# Patient Record
Sex: FEMALE | Race: OTHER | HISPANIC OR LATINO | URBAN - METROPOLITAN AREA
[De-identification: names, ages, dates, MRNs, and addresses within clinical notes are randomized per-mention and may not be internally consistent; named-entity substitution may affect disease eponyms.]

---

## 2019-08-01 ENCOUNTER — INPATIENT (INPATIENT)
Facility: HOSPITAL | Age: 59
LOS: 4 days | Discharge: HOME | End: 2019-08-06
Attending: INTERNAL MEDICINE | Admitting: INTERNAL MEDICINE
Payer: MEDICAID

## 2019-08-01 VITALS
OXYGEN SATURATION: 97 % | DIASTOLIC BLOOD PRESSURE: 71 MMHG | RESPIRATION RATE: 18 BRPM | SYSTOLIC BLOOD PRESSURE: 105 MMHG | HEART RATE: 106 BPM

## 2019-08-01 LAB
ALBUMIN SERPL ELPH-MCNC: 3.5 G/DL — SIGNIFICANT CHANGE UP (ref 3.3–5)
ALP SERPL-CCNC: 59 U/L — SIGNIFICANT CHANGE UP (ref 40–120)
ALT FLD-CCNC: 13 U/L — SIGNIFICANT CHANGE UP (ref 4–33)
ANION GAP SERPL CALC-SCNC: 13 MMO/L — SIGNIFICANT CHANGE UP (ref 7–14)
APTT BLD: 21.6 SEC — LOW (ref 27.5–36.3)
AST SERPL-CCNC: 25 U/L — SIGNIFICANT CHANGE UP (ref 4–32)
BASE EXCESS BLDV CALC-SCNC: -0.3 MMOL/L — SIGNIFICANT CHANGE UP
BASOPHILS # BLD AUTO: 0.03 K/UL — SIGNIFICANT CHANGE UP (ref 0–0.2)
BASOPHILS NFR BLD AUTO: 0.3 % — SIGNIFICANT CHANGE UP (ref 0–2)
BILIRUB SERPL-MCNC: < 0.2 MG/DL — LOW (ref 0.2–1.2)
BLD GP AB SCN SERPL QL: NEGATIVE — SIGNIFICANT CHANGE UP
BLOOD GAS VENOUS - CREATININE: 1.08 MG/DL — SIGNIFICANT CHANGE UP (ref 0.5–1.3)
BLOOD GAS VENOUS - FIO2: 21 — SIGNIFICANT CHANGE UP
BUN SERPL-MCNC: 42 MG/DL — HIGH (ref 7–23)
CALCIUM SERPL-MCNC: 9.1 MG/DL — SIGNIFICANT CHANGE UP (ref 8.4–10.5)
CHLORIDE BLDV-SCNC: 101 MMOL/L — SIGNIFICANT CHANGE UP (ref 96–108)
CHLORIDE SERPL-SCNC: 98 MMOL/L — SIGNIFICANT CHANGE UP (ref 98–107)
CO2 SERPL-SCNC: 22 MMOL/L — SIGNIFICANT CHANGE UP (ref 22–31)
CREAT SERPL-MCNC: 0.9 MG/DL — SIGNIFICANT CHANGE UP (ref 0.5–1.3)
EOSINOPHIL # BLD AUTO: 0.01 K/UL — SIGNIFICANT CHANGE UP (ref 0–0.5)
EOSINOPHIL NFR BLD AUTO: 0.1 % — SIGNIFICANT CHANGE UP (ref 0–6)
GAS PNL BLDV: 129 MMOL/L — LOW (ref 136–146)
GLUCOSE BLDV-MCNC: 143 MG/DL — HIGH (ref 70–99)
GLUCOSE SERPL-MCNC: 139 MG/DL — HIGH (ref 70–99)
HCO3 BLDV-SCNC: 24 MMOL/L — SIGNIFICANT CHANGE UP (ref 20–27)
HCT VFR BLD CALC: 17.9 % — CRITICAL LOW (ref 34.5–45)
HCT VFR BLDV CALC: 20.1 % — CRITICAL LOW (ref 34.5–45)
HGB BLD-MCNC: 5.9 G/DL — CRITICAL LOW (ref 11.5–15.5)
HGB BLDV-MCNC: 6.4 G/DL — CRITICAL LOW (ref 11.5–15.5)
IMM GRANULOCYTES NFR BLD AUTO: 0.7 % — SIGNIFICANT CHANGE UP (ref 0–1.5)
INR BLD: 0.98 — SIGNIFICANT CHANGE UP (ref 0.88–1.17)
LACTATE BLDV-MCNC: 2.5 MMOL/L — HIGH (ref 0.5–2)
LYMPHOCYTES # BLD AUTO: 2.71 K/UL — SIGNIFICANT CHANGE UP (ref 1–3.3)
LYMPHOCYTES # BLD AUTO: 31.4 % — SIGNIFICANT CHANGE UP (ref 13–44)
MCHC RBC-ENTMCNC: 32.6 PG — SIGNIFICANT CHANGE UP (ref 27–34)
MCHC RBC-ENTMCNC: 33 % — SIGNIFICANT CHANGE UP (ref 32–36)
MCV RBC AUTO: 98.9 FL — SIGNIFICANT CHANGE UP (ref 80–100)
MONOCYTES # BLD AUTO: 0.52 K/UL — SIGNIFICANT CHANGE UP (ref 0–0.9)
MONOCYTES NFR BLD AUTO: 6 % — SIGNIFICANT CHANGE UP (ref 2–14)
NEUTROPHILS # BLD AUTO: 5.31 K/UL — SIGNIFICANT CHANGE UP (ref 1.8–7.4)
NEUTROPHILS NFR BLD AUTO: 61.5 % — SIGNIFICANT CHANGE UP (ref 43–77)
NRBC # FLD: 0 K/UL — SIGNIFICANT CHANGE UP (ref 0–0)
OB PNL STL: POSITIVE — SIGNIFICANT CHANGE UP
PCO2 BLDV: 35 MMHG — LOW (ref 41–51)
PH BLDV: 7.44 PH — HIGH (ref 7.32–7.43)
PLATELET # BLD AUTO: 273 K/UL — SIGNIFICANT CHANGE UP (ref 150–400)
PMV BLD: 9.1 FL — SIGNIFICANT CHANGE UP (ref 7–13)
PO2 BLDV: 26 MMHG — LOW (ref 35–40)
POTASSIUM BLDV-SCNC: 3.4 MMOL/L — SIGNIFICANT CHANGE UP (ref 3.4–4.5)
POTASSIUM SERPL-MCNC: 4.3 MMOL/L — SIGNIFICANT CHANGE UP (ref 3.5–5.3)
POTASSIUM SERPL-SCNC: 4.3 MMOL/L — SIGNIFICANT CHANGE UP (ref 3.5–5.3)
PROT SERPL-MCNC: 5.9 G/DL — LOW (ref 6–8.3)
PROTHROM AB SERPL-ACNC: 11.2 SEC — SIGNIFICANT CHANGE UP (ref 9.8–13.1)
RBC # BLD: 1.81 M/UL — LOW (ref 3.8–5.2)
RBC # FLD: 12.8 % — SIGNIFICANT CHANGE UP (ref 10.3–14.5)
RH IG SCN BLD-IMP: POSITIVE — SIGNIFICANT CHANGE UP
SAO2 % BLDV: 43.4 % — LOW (ref 60–85)
SODIUM SERPL-SCNC: 133 MMOL/L — LOW (ref 135–145)
TROPONIN T, HIGH SENSITIVITY: 35 NG/L — SIGNIFICANT CHANGE UP (ref ?–14)
WBC # BLD: 8.64 K/UL — SIGNIFICANT CHANGE UP (ref 3.8–10.5)
WBC # FLD AUTO: 8.64 K/UL — SIGNIFICANT CHANGE UP (ref 3.8–10.5)

## 2019-08-01 RX ORDER — CEFTRIAXONE 500 MG/1
1000 INJECTION, POWDER, FOR SOLUTION INTRAMUSCULAR; INTRAVENOUS ONCE
Refills: 0 | Status: COMPLETED | OUTPATIENT
Start: 2019-08-01 | End: 2019-08-01

## 2019-08-01 RX ORDER — SODIUM CHLORIDE 9 MG/ML
1000 INJECTION INTRAMUSCULAR; INTRAVENOUS; SUBCUTANEOUS ONCE
Refills: 0 | Status: COMPLETED | OUTPATIENT
Start: 2019-08-01 | End: 2019-08-01

## 2019-08-01 RX ORDER — OCTREOTIDE ACETATE 200 UG/ML
50 INJECTION, SOLUTION INTRAVENOUS; SUBCUTANEOUS ONCE
Refills: 0 | Status: COMPLETED | OUTPATIENT
Start: 2019-08-01 | End: 2019-08-01

## 2019-08-01 RX ORDER — ACETAMINOPHEN 500 MG
975 TABLET ORAL ONCE
Refills: 0 | Status: COMPLETED | OUTPATIENT
Start: 2019-08-01 | End: 2019-08-01

## 2019-08-01 RX ORDER — METOCLOPRAMIDE HCL 10 MG
10 TABLET ORAL ONCE
Refills: 0 | Status: COMPLETED | OUTPATIENT
Start: 2019-08-01 | End: 2019-08-01

## 2019-08-01 RX ORDER — PANTOPRAZOLE SODIUM 20 MG/1
8 TABLET, DELAYED RELEASE ORAL
Qty: 80 | Refills: 0 | Status: DISCONTINUED | OUTPATIENT
Start: 2019-08-01 | End: 2019-08-04

## 2019-08-01 RX ORDER — PANTOPRAZOLE SODIUM 20 MG/1
80 TABLET, DELAYED RELEASE ORAL ONCE
Refills: 0 | Status: COMPLETED | OUTPATIENT
Start: 2019-08-01 | End: 2019-08-01

## 2019-08-01 RX ADMIN — PANTOPRAZOLE SODIUM 80 MILLIGRAM(S): 20 TABLET, DELAYED RELEASE ORAL at 22:04

## 2019-08-01 RX ADMIN — SODIUM CHLORIDE 1000 MILLILITER(S): 9 INJECTION INTRAMUSCULAR; INTRAVENOUS; SUBCUTANEOUS at 21:25

## 2019-08-01 RX ADMIN — CEFTRIAXONE 100 MILLIGRAM(S): 500 INJECTION, POWDER, FOR SOLUTION INTRAMUSCULAR; INTRAVENOUS at 22:19

## 2019-08-01 RX ADMIN — Medication 975 MILLIGRAM(S): at 22:30

## 2019-08-01 RX ADMIN — Medication 10 MILLIGRAM(S): at 21:25

## 2019-08-01 RX ADMIN — PANTOPRAZOLE SODIUM 10 MG/HR: 20 TABLET, DELAYED RELEASE ORAL at 22:26

## 2019-08-01 RX ADMIN — Medication 975 MILLIGRAM(S): at 21:26

## 2019-08-01 NOTE — ED ADULT NURSE NOTE - NSIMPLEMENTINTERV_GEN_ALL_ED
Implemented All Universal Safety Interventions:  [place] to call system. Call bell, personal items and telephone within reach. Instruct patient to call for assistance. Room bathroom lighting operational. Non-slip footwear when patient is off stretcher. Physically safe environment: no spills, clutter or unnecessary equipment. Stretcher in lowest position, wheels locked, appropriate side rails in place.

## 2019-08-01 NOTE — ED PROVIDER NOTE - CRANIAL NERVE AND PUPILLARY EXAM
cranial nerves 2-12 intact/cough reflex intact/corneal reflex intact/central and peripheral vision intact/central vision intact/peripheral vision intact/PERIPHERAL VISION NOT INTACT/extra-ocular movements intact

## 2019-08-01 NOTE — ED PROVIDER NOTE - ATTENDING CONTRIBUTION TO CARE
Celeste: presented to me by TONG Lang at 2140. Pt initially came to ED for headache and dizziness. While waiting for evaluation pt developed nausea and vomiting and then emesis of coffee ground 1/2 bag. Pt also complaining of some lower chest and abd pain. On exam, initial vitals stable but then noted to be hypotensive and tachy. noted to be pale, lungs clear, abd soft with +tn in epigastric area. Daughter then stated that pt is a daily alcohol drinker. Concerned for GI bleed. variceal/esophageal/gastric ulcer. PPI, 2 L IV fluids with pressure bag, 2 large bore IVs, type and cross. Pt requires more care so transferred from intake to TR B and endorsed to Dr. Carnes. Daughter aware of pt's critical nature.

## 2019-08-01 NOTE — ED PROVIDER NOTE - PHYSICAL EXAMINATION
*Gen: AAO*3, restless/moving around in med  *HEENT: NC/AT, conjunctival pallor, dry mucous membranes, airway patent, trachea midline  *CV: tachycardia, S1/S2 present, no murmurs/rubs  *Resp: no respiratory distress, LCTAB, no wheezing/rales  *Abd: non-distended, soft N/Tx4, no guarding or rigidity  *Neuro: no focal neuro deficits, moving all limbs appropriately  *Extremities: no gross deformity  *Skin: no rashes, no wounds   ~ Avelino Tolentino M.D.

## 2019-08-01 NOTE — ED ADULT NURSE NOTE - OBJECTIVE STATEMENT
received pt sitting in stretcher reporting headache and pain in ears with nausea for a few days.  pt reports she is also now having chest pain.  IV placed, blood drawn and pt medicated.  As pt was being medicated had episode of moderate amount of dark red blood emesis.  pt hypotensive and tachycardic.  md immediately to bedside. pt drinks alcohol daily, approx 6 beers, no hx of similar symptoms in the past, last drink yesterday. 2nd iv access obtained, #18 to left AC, fluids infusing.  charge rn made aware to prepare room in main ed.  pt brought to trb and endorsed to RN.

## 2019-08-01 NOTE — ED PROVIDER NOTE - CLINICAL SUMMARY MEDICAL DECISION MAKING FREE TEXT BOX
Headache dizziness-labs, fluids, reglan, tylenol, ekg trop, chest xray Headache dizziness-labs, fluids, reglan, tylenol, ekg trop, chest xray    Randa VENEGAS: 58 year-old female with history of HTN, and chronic alcohol use presents to the Emergency Department for lightheadedness/weakness x 2 days; concerns for upper GI bleed.  Plan for labs, CTA, Protonix and TBA.

## 2019-08-01 NOTE — ED PROVIDER NOTE - OBJECTIVE STATEMENT
58 y.o female with a PMHx of HTN, current smoker and chronic drinker presents to the ED complaining of having dizziness and headache on and off over the past month along with chest pain over the past 2 days. Patient states she started to have a generalized headache on and off and describes her dizziness as a spinning sensation. Patient also admits to having left sided chest pain over the past few days. Pt also admits to having associated shortness of breath. patient denies having any fever, chills, abdominal pain. 58 y.o female with a PMHx of HTN, current smoker and chronic drinker presents to the ED complaining of having dizziness and headache on and off over the past month along with chest pain over the past 2 days. Patient states she started to have a generalized headache on and off and describes her dizziness as a spinning sensation. Patient also admits to having left sided chest pain over the past few days. Pt also admits to having associated shortness of breath. patient denies having any fever, chills, abdominal pain.    Randa VENEGAS: 58 year-old female with history of HTN, and chronic alcohol use presents to the Emergency Department for lightheadedness/weakness x 2 days; progressively worsening.  Seen in Intake; found to have an episode of hematemesis and hypotension to th 80s and transferred to the critical area for further evaluation.  Patient reports feeling better after emesis episode.  + epigastric abdominal pain; now resolved.  No fevers, chills, chest pain, shortness of breath.  No prior endoscopy/colonscopy in the past.

## 2019-08-01 NOTE — ED PROVIDER NOTE - PROGRESS NOTE DETAILS
PA Ali: Patient started to become hypotensive and tachycardic. Patient had hematemesis after patient had IV place. Patient started to become pale. Will contact charge nurse and transfer patient to main ER. Randa VENEGAS: Noted Hb of 5.9; ordered two units of HB.  Consent done.  Family aware.  Pend. CTA. Randa VENEGAS: Spoke with GI; req. Octreotide and Ceftriaxone. Mirza PGY3: received sign out from [employer] dr. canchola, pt w c/f upper gi bleed, had episode of syncope while going to bathroom put out copious melanotic / BRBPR appprox 1 pint, bp 90's systolic, per radiology resident c/f brisk arterial gastric antrum bleed, discussed with micu and gi, would recommend evaluation from IR for possible embolization of arterial gastric antrum bleed, paged IR x3 since 0245, awaiting call back. WILBER: Talked to IR attending and he is on board, rec's GI to EGD and if unsuccessful then they will try embolization. Pt receiving PRBC 4th and 5th unit, will repeat CBC to get Hb above 7 for GI

## 2019-08-01 NOTE — ED ADULT TRIAGE NOTE - CHIEF COMPLAINT QUOTE
Pt c/o headache, B/L ear pain, occipital headache, and dizziness.  Pt started taking BP meds 2 months ago and thinks her BP is sometimes low and sometimes high

## 2019-08-02 DIAGNOSIS — Z29.9 ENCOUNTER FOR PROPHYLACTIC MEASURES, UNSPECIFIED: ICD-10-CM

## 2019-08-02 DIAGNOSIS — F41.9 ANXIETY DISORDER, UNSPECIFIED: ICD-10-CM

## 2019-08-02 DIAGNOSIS — K92.2 GASTROINTESTINAL HEMORRHAGE, UNSPECIFIED: ICD-10-CM

## 2019-08-02 DIAGNOSIS — F10.10 ALCOHOL ABUSE, UNCOMPLICATED: ICD-10-CM

## 2019-08-02 DIAGNOSIS — F17.200 NICOTINE DEPENDENCE, UNSPECIFIED, UNCOMPLICATED: ICD-10-CM

## 2019-08-02 DIAGNOSIS — R65.10 SYSTEMIC INFLAMMATORY RESPONSE SYNDROME (SIRS) OF NON-INFECTIOUS ORIGIN WITHOUT ACUTE ORGAN DYSFUNCTION: ICD-10-CM

## 2019-08-02 DIAGNOSIS — D50.0 IRON DEFICIENCY ANEMIA SECONDARY TO BLOOD LOSS (CHRONIC): ICD-10-CM

## 2019-08-02 DIAGNOSIS — I10 ESSENTIAL (PRIMARY) HYPERTENSION: ICD-10-CM

## 2019-08-02 DIAGNOSIS — Z98.890 OTHER SPECIFIED POSTPROCEDURAL STATES: Chronic | ICD-10-CM

## 2019-08-02 LAB
ALBUMIN SERPL ELPH-MCNC: 2.7 G/DL — LOW (ref 3.3–5)
ALP SERPL-CCNC: 46 U/L — SIGNIFICANT CHANGE UP (ref 40–120)
ALT FLD-CCNC: 12 U/L — SIGNIFICANT CHANGE UP (ref 4–33)
ANION GAP SERPL CALC-SCNC: 10 MMO/L — SIGNIFICANT CHANGE UP (ref 7–14)
ANION GAP SERPL CALC-SCNC: 6 MMO/L — LOW (ref 7–14)
AST SERPL-CCNC: 23 U/L — SIGNIFICANT CHANGE UP (ref 4–32)
BASE EXCESS BLDV CALC-SCNC: -3.4 MMOL/L — SIGNIFICANT CHANGE UP
BASOPHILS # BLD AUTO: 0.02 K/UL — SIGNIFICANT CHANGE UP (ref 0–0.2)
BASOPHILS # BLD AUTO: 0.02 K/UL — SIGNIFICANT CHANGE UP (ref 0–0.2)
BASOPHILS # BLD AUTO: 0.04 K/UL — SIGNIFICANT CHANGE UP (ref 0–0.2)
BASOPHILS NFR BLD AUTO: 0.2 % — SIGNIFICANT CHANGE UP (ref 0–2)
BASOPHILS NFR BLD AUTO: 0.3 % — SIGNIFICANT CHANGE UP (ref 0–2)
BASOPHILS NFR BLD AUTO: 0.3 % — SIGNIFICANT CHANGE UP (ref 0–2)
BASOPHILS NFR SPEC: 0 % — SIGNIFICANT CHANGE UP (ref 0–2)
BILIRUB SERPL-MCNC: 0.9 MG/DL — SIGNIFICANT CHANGE UP (ref 0.2–1.2)
BLASTS # FLD: 0 % — SIGNIFICANT CHANGE UP (ref 0–0)
BLOOD GAS VENOUS - CREATININE: 0.72 MG/DL — SIGNIFICANT CHANGE UP (ref 0.5–1.3)
BLOOD GAS VENOUS - FIO2: 21 — SIGNIFICANT CHANGE UP
BUN SERPL-MCNC: 17 MG/DL — SIGNIFICANT CHANGE UP (ref 7–23)
BUN SERPL-MCNC: 24 MG/DL — HIGH (ref 7–23)
CALCIUM SERPL-MCNC: 7.4 MG/DL — LOW (ref 8.4–10.5)
CALCIUM SERPL-MCNC: 8 MG/DL — LOW (ref 8.4–10.5)
CHLORIDE BLDV-SCNC: 112 MMOL/L — HIGH (ref 96–108)
CHLORIDE SERPL-SCNC: 109 MMOL/L — HIGH (ref 98–107)
CHLORIDE SERPL-SCNC: 113 MMOL/L — HIGH (ref 98–107)
CO2 SERPL-SCNC: 19 MMOL/L — LOW (ref 22–31)
CO2 SERPL-SCNC: 21 MMOL/L — LOW (ref 22–31)
CREAT SERPL-MCNC: 0.7 MG/DL — SIGNIFICANT CHANGE UP (ref 0.5–1.3)
CREAT SERPL-MCNC: 0.71 MG/DL — SIGNIFICANT CHANGE UP (ref 0.5–1.3)
EOSINOPHIL # BLD AUTO: 0 K/UL — SIGNIFICANT CHANGE UP (ref 0–0.5)
EOSINOPHIL # BLD AUTO: 0.01 K/UL — SIGNIFICANT CHANGE UP (ref 0–0.5)
EOSINOPHIL # BLD AUTO: 0.04 K/UL — SIGNIFICANT CHANGE UP (ref 0–0.5)
EOSINOPHIL NFR BLD AUTO: 0 % — SIGNIFICANT CHANGE UP (ref 0–6)
EOSINOPHIL NFR BLD AUTO: 0.1 % — SIGNIFICANT CHANGE UP (ref 0–6)
EOSINOPHIL NFR BLD AUTO: 0.3 % — SIGNIFICANT CHANGE UP (ref 0–6)
EOSINOPHIL NFR FLD: 0 % — SIGNIFICANT CHANGE UP (ref 0–6)
FERRITIN SERPL-MCNC: 45.07 NG/ML — SIGNIFICANT CHANGE UP (ref 15–150)
FOLATE SERPL-MCNC: 10 NG/ML — SIGNIFICANT CHANGE UP (ref 4.7–20)
GAS PNL BLDV: 134 MMOL/L — LOW (ref 136–146)
GLUCOSE BLDV-MCNC: 157 MG/DL — HIGH (ref 70–99)
GLUCOSE SERPL-MCNC: 113 MG/DL — HIGH (ref 70–99)
GLUCOSE SERPL-MCNC: 159 MG/DL — HIGH (ref 70–99)
HCO3 BLDV-SCNC: 21 MMOL/L — SIGNIFICANT CHANGE UP (ref 20–27)
HCT VFR BLD CALC: 18.4 % — CRITICAL LOW (ref 34.5–45)
HCT VFR BLD CALC: 26.1 % — LOW (ref 34.5–45)
HCT VFR BLD CALC: 26.6 % — LOW (ref 34.5–45)
HCT VFR BLD CALC: 29.9 % — LOW (ref 34.5–45)
HCT VFR BLD CALC: 29.9 % — LOW (ref 34.5–45)
HCT VFR BLDV CALC: 30 % — LOW (ref 34.5–45)
HGB BLD-MCNC: 10.2 G/DL — LOW (ref 11.5–15.5)
HGB BLD-MCNC: 10.3 G/DL — LOW (ref 11.5–15.5)
HGB BLD-MCNC: 6.1 G/DL — CRITICAL LOW (ref 11.5–15.5)
HGB BLD-MCNC: 9 G/DL — LOW (ref 11.5–15.5)
HGB BLD-MCNC: 9.2 G/DL — LOW (ref 11.5–15.5)
HGB BLDV-MCNC: 9.7 G/DL — LOW (ref 11.5–15.5)
HYPOCHROMIA BLD QL: SIGNIFICANT CHANGE UP
IMM GRANULOCYTES NFR BLD AUTO: 0.9 % — SIGNIFICANT CHANGE UP (ref 0–1.5)
IMM GRANULOCYTES NFR BLD AUTO: 0.9 % — SIGNIFICANT CHANGE UP (ref 0–1.5)
IMM GRANULOCYTES NFR BLD AUTO: 1.2 % — SIGNIFICANT CHANGE UP (ref 0–1.5)
IRON SATN MFR SERPL: 187 UG/DL — HIGH (ref 30–160)
IRON SATN MFR SERPL: 187 UG/DL — SIGNIFICANT CHANGE UP (ref 140–530)
LACTATE BLDV-MCNC: 1.3 MMOL/L — SIGNIFICANT CHANGE UP (ref 0.5–2)
LYMPHOCYTES # BLD AUTO: 1.1 K/UL — SIGNIFICANT CHANGE UP (ref 1–3.3)
LYMPHOCYTES # BLD AUTO: 1.72 K/UL — SIGNIFICANT CHANGE UP (ref 1–3.3)
LYMPHOCYTES # BLD AUTO: 12.4 % — LOW (ref 13–44)
LYMPHOCYTES # BLD AUTO: 17 % — SIGNIFICANT CHANGE UP (ref 13–44)
LYMPHOCYTES # BLD AUTO: 2.38 K/UL — SIGNIFICANT CHANGE UP (ref 1–3.3)
LYMPHOCYTES # BLD AUTO: 22.4 % — SIGNIFICANT CHANGE UP (ref 13–44)
LYMPHOCYTES NFR SPEC AUTO: 13.2 % — SIGNIFICANT CHANGE UP (ref 13–44)
MAGNESIUM SERPL-MCNC: 1.9 MG/DL — SIGNIFICANT CHANGE UP (ref 1.6–2.6)
MAGNESIUM SERPL-MCNC: 2 MG/DL — SIGNIFICANT CHANGE UP (ref 1.6–2.6)
MCHC RBC-ENTMCNC: 29.3 PG — SIGNIFICANT CHANGE UP (ref 27–34)
MCHC RBC-ENTMCNC: 29.7 PG — SIGNIFICANT CHANGE UP (ref 27–34)
MCHC RBC-ENTMCNC: 29.7 PG — SIGNIFICANT CHANGE UP (ref 27–34)
MCHC RBC-ENTMCNC: 30.4 PG — SIGNIFICANT CHANGE UP (ref 27–34)
MCHC RBC-ENTMCNC: 31.3 PG — SIGNIFICANT CHANGE UP (ref 27–34)
MCHC RBC-ENTMCNC: 33.2 % — SIGNIFICANT CHANGE UP (ref 32–36)
MCHC RBC-ENTMCNC: 34.1 % — SIGNIFICANT CHANGE UP (ref 32–36)
MCHC RBC-ENTMCNC: 34.4 % — SIGNIFICANT CHANGE UP (ref 32–36)
MCHC RBC-ENTMCNC: 34.5 % — SIGNIFICANT CHANGE UP (ref 32–36)
MCHC RBC-ENTMCNC: 34.6 % — SIGNIFICANT CHANGE UP (ref 32–36)
MCV RBC AUTO: 85.2 FL — SIGNIFICANT CHANGE UP (ref 80–100)
MCV RBC AUTO: 85.8 FL — SIGNIFICANT CHANGE UP (ref 80–100)
MCV RBC AUTO: 86.9 FL — SIGNIFICANT CHANGE UP (ref 80–100)
MCV RBC AUTO: 88.2 FL — SIGNIFICANT CHANGE UP (ref 80–100)
MCV RBC AUTO: 94.4 FL — SIGNIFICANT CHANGE UP (ref 80–100)
METAMYELOCYTES # FLD: 0.9 % — SIGNIFICANT CHANGE UP (ref 0–1)
MONOCYTES # BLD AUTO: 0.56 K/UL — SIGNIFICANT CHANGE UP (ref 0–0.9)
MONOCYTES # BLD AUTO: 0.58 K/UL — SIGNIFICANT CHANGE UP (ref 0–0.9)
MONOCYTES # BLD AUTO: 0.72 K/UL — SIGNIFICANT CHANGE UP (ref 0–0.9)
MONOCYTES NFR BLD AUTO: 5.1 % — SIGNIFICANT CHANGE UP (ref 2–14)
MONOCYTES NFR BLD AUTO: 6.3 % — SIGNIFICANT CHANGE UP (ref 2–14)
MONOCYTES NFR BLD AUTO: 7.6 % — SIGNIFICANT CHANGE UP (ref 2–14)
MONOCYTES NFR BLD: 0 % — LOW (ref 2–9)
MYELOCYTES NFR BLD: 0 % — SIGNIFICANT CHANGE UP (ref 0–0)
NEUTROPHIL AB SER-ACNC: 79.8 % — HIGH (ref 43–77)
NEUTROPHILS # BLD AUTO: 10.69 K/UL — HIGH (ref 1.8–7.4)
NEUTROPHILS # BLD AUTO: 5.27 K/UL — SIGNIFICANT CHANGE UP (ref 1.8–7.4)
NEUTROPHILS # BLD AUTO: 7.13 K/UL — SIGNIFICANT CHANGE UP (ref 1.8–7.4)
NEUTROPHILS NFR BLD AUTO: 68.7 % — SIGNIFICANT CHANGE UP (ref 43–77)
NEUTROPHILS NFR BLD AUTO: 76.1 % — SIGNIFICANT CHANGE UP (ref 43–77)
NEUTROPHILS NFR BLD AUTO: 80.2 % — HIGH (ref 43–77)
NEUTS BAND # BLD: 6.1 % — HIGH (ref 0–6)
NRBC # FLD: 0 K/UL — SIGNIFICANT CHANGE UP (ref 0–0)
NRBC # FLD: 0.02 K/UL — SIGNIFICANT CHANGE UP (ref 0–0)
NRBC # FLD: 0.03 K/UL — SIGNIFICANT CHANGE UP (ref 0–0)
OTHER - HEMATOLOGY %: 0 — SIGNIFICANT CHANGE UP
PCO2 BLDV: 39 MMHG — LOW (ref 41–51)
PH BLDV: 7.36 PH — SIGNIFICANT CHANGE UP (ref 7.32–7.43)
PHOSPHATE SERPL-MCNC: 2.9 MG/DL — SIGNIFICANT CHANGE UP (ref 2.5–4.5)
PHOSPHATE SERPL-MCNC: 3 MG/DL — SIGNIFICANT CHANGE UP (ref 2.5–4.5)
PLATELET # BLD AUTO: 165 K/UL — SIGNIFICANT CHANGE UP (ref 150–400)
PLATELET # BLD AUTO: 169 K/UL — SIGNIFICANT CHANGE UP (ref 150–400)
PLATELET # BLD AUTO: 193 K/UL — SIGNIFICANT CHANGE UP (ref 150–400)
PLATELET # BLD AUTO: 195 K/UL — SIGNIFICANT CHANGE UP (ref 150–400)
PLATELET # BLD AUTO: 203 K/UL — SIGNIFICANT CHANGE UP (ref 150–400)
PLATELET COUNT - ESTIMATE: NORMAL — SIGNIFICANT CHANGE UP
PMV BLD: 9.1 FL — SIGNIFICANT CHANGE UP (ref 7–13)
PMV BLD: 9.4 FL — SIGNIFICANT CHANGE UP (ref 7–13)
PMV BLD: 9.5 FL — SIGNIFICANT CHANGE UP (ref 7–13)
PMV BLD: 9.6 FL — SIGNIFICANT CHANGE UP (ref 7–13)
PMV BLD: 9.6 FL — SIGNIFICANT CHANGE UP (ref 7–13)
PO2 BLDV: 36 MMHG — SIGNIFICANT CHANGE UP (ref 35–40)
POTASSIUM BLDV-SCNC: 4.2 MMOL/L — SIGNIFICANT CHANGE UP (ref 3.4–4.5)
POTASSIUM SERPL-MCNC: 3.7 MMOL/L — SIGNIFICANT CHANGE UP (ref 3.5–5.3)
POTASSIUM SERPL-MCNC: 4.4 MMOL/L — SIGNIFICANT CHANGE UP (ref 3.5–5.3)
POTASSIUM SERPL-SCNC: 3.7 MMOL/L — SIGNIFICANT CHANGE UP (ref 3.5–5.3)
POTASSIUM SERPL-SCNC: 4.4 MMOL/L — SIGNIFICANT CHANGE UP (ref 3.5–5.3)
PROMYELOCYTES # FLD: 0 % — SIGNIFICANT CHANGE UP (ref 0–0)
PROT SERPL-MCNC: 4.5 G/DL — LOW (ref 6–8.3)
RBC # BLD: 1.95 M/UL — LOW (ref 3.8–5.2)
RBC # BLD: 2.96 M/UL — LOW (ref 3.8–5.2)
RBC # BLD: 3.1 M/UL — LOW (ref 3.8–5.2)
RBC # BLD: 3.44 M/UL — LOW (ref 3.8–5.2)
RBC # BLD: 3.51 M/UL — LOW (ref 3.8–5.2)
RBC # FLD: 15.3 % — HIGH (ref 10.3–14.5)
RBC # FLD: 15.9 % — HIGH (ref 10.3–14.5)
RBC # FLD: 16.7 % — HIGH (ref 10.3–14.5)
RBC # FLD: 17.7 % — HIGH (ref 10.3–14.5)
RBC # FLD: 18.1 % — HIGH (ref 10.3–14.5)
SAO2 % BLDV: 69 % — SIGNIFICANT CHANGE UP (ref 60–85)
SODIUM SERPL-SCNC: 138 MMOL/L — SIGNIFICANT CHANGE UP (ref 135–145)
SODIUM SERPL-SCNC: 140 MMOL/L — SIGNIFICANT CHANGE UP (ref 135–145)
UIBC SERPL-MCNC: < 10 UG/DL — LOW (ref 110–370)
VARIANT LYMPHS # BLD: 0 % — SIGNIFICANT CHANGE UP
VIT B12 SERPL-MCNC: 255 PG/ML — SIGNIFICANT CHANGE UP (ref 200–900)
WBC # BLD: 14.04 K/UL — HIGH (ref 3.8–10.5)
WBC # BLD: 7.67 K/UL — SIGNIFICANT CHANGE UP (ref 3.8–10.5)
WBC # BLD: 8.21 K/UL — SIGNIFICANT CHANGE UP (ref 3.8–10.5)
WBC # BLD: 8.89 K/UL — SIGNIFICANT CHANGE UP (ref 3.8–10.5)
WBC # BLD: 9.66 K/UL — SIGNIFICANT CHANGE UP (ref 3.8–10.5)
WBC # FLD AUTO: 14.04 K/UL — HIGH (ref 3.8–10.5)
WBC # FLD AUTO: 7.67 K/UL — SIGNIFICANT CHANGE UP (ref 3.8–10.5)
WBC # FLD AUTO: 8.21 K/UL — SIGNIFICANT CHANGE UP (ref 3.8–10.5)
WBC # FLD AUTO: 8.89 K/UL — SIGNIFICANT CHANGE UP (ref 3.8–10.5)
WBC # FLD AUTO: 9.66 K/UL — SIGNIFICANT CHANGE UP (ref 3.8–10.5)

## 2019-08-02 PROCEDURE — 43255 EGD CONTROL BLEEDING ANY: CPT | Mod: GC

## 2019-08-02 PROCEDURE — 99222 1ST HOSP IP/OBS MODERATE 55: CPT | Mod: 25,GC

## 2019-08-02 PROCEDURE — 74174 CTA ABD&PLVS W/CONTRAST: CPT | Mod: 26

## 2019-08-02 PROCEDURE — 99223 1ST HOSP IP/OBS HIGH 75: CPT | Mod: GC

## 2019-08-02 PROCEDURE — 31500 INSERT EMERGENCY AIRWAY: CPT

## 2019-08-02 PROCEDURE — 99291 CRITICAL CARE FIRST HOUR: CPT | Mod: 25

## 2019-08-02 PROCEDURE — 71045 X-RAY EXAM CHEST 1 VIEW: CPT | Mod: 26,76

## 2019-08-02 RX ORDER — PROPOFOL 10 MG/ML
40 INJECTION, EMULSION INTRAVENOUS ONCE
Refills: 0 | Status: COMPLETED | OUTPATIENT
Start: 2019-08-02 | End: 2019-08-02

## 2019-08-02 RX ORDER — FENTANYL CITRATE 50 UG/ML
100 INJECTION INTRAVENOUS ONCE
Refills: 0 | Status: DISCONTINUED | OUTPATIENT
Start: 2019-08-02 | End: 2019-08-02

## 2019-08-02 RX ORDER — METOCLOPRAMIDE HCL 10 MG
10 TABLET ORAL ONCE
Refills: 0 | Status: COMPLETED | OUTPATIENT
Start: 2019-08-02 | End: 2019-08-02

## 2019-08-02 RX ORDER — CHLORHEXIDINE GLUCONATE 213 G/1000ML
15 SOLUTION TOPICAL EVERY 12 HOURS
Refills: 0 | Status: DISCONTINUED | OUTPATIENT
Start: 2019-08-02 | End: 2019-08-03

## 2019-08-02 RX ORDER — MIDAZOLAM HYDROCHLORIDE 1 MG/ML
8 INJECTION, SOLUTION INTRAMUSCULAR; INTRAVENOUS ONCE
Refills: 0 | Status: DISCONTINUED | OUTPATIENT
Start: 2019-08-02 | End: 2019-08-02

## 2019-08-02 RX ORDER — THIAMINE MONONITRATE (VIT B1) 100 MG
100 TABLET ORAL DAILY
Refills: 0 | Status: DISCONTINUED | OUTPATIENT
Start: 2019-08-02 | End: 2019-08-02

## 2019-08-02 RX ORDER — THIAMINE MONONITRATE (VIT B1) 100 MG
100 TABLET ORAL ONCE
Refills: 0 | Status: DISCONTINUED | OUTPATIENT
Start: 2019-08-02 | End: 2019-08-02

## 2019-08-02 RX ORDER — SUCCINYLCHOLINE CHLORIDE 100 MG/5ML
75 SYRINGE (ML) INTRAVENOUS ONCE
Refills: 0 | Status: COMPLETED | OUTPATIENT
Start: 2019-08-02 | End: 2019-08-02

## 2019-08-02 RX ORDER — ONDANSETRON 8 MG/1
4 TABLET, FILM COATED ORAL ONCE
Refills: 0 | Status: COMPLETED | OUTPATIENT
Start: 2019-08-02 | End: 2019-08-02

## 2019-08-02 RX ORDER — FENTANYL CITRATE 50 UG/ML
1 INJECTION INTRAVENOUS
Qty: 2500 | Refills: 0 | Status: DISCONTINUED | OUTPATIENT
Start: 2019-08-02 | End: 2019-08-03

## 2019-08-02 RX ORDER — THIAMINE MONONITRATE (VIT B1) 100 MG
100 TABLET ORAL DAILY
Refills: 0 | Status: COMPLETED | OUTPATIENT
Start: 2019-08-02 | End: 2019-08-04

## 2019-08-02 RX ORDER — PROPOFOL 10 MG/ML
50 INJECTION, EMULSION INTRAVENOUS
Qty: 1000 | Refills: 0 | Status: DISCONTINUED | OUTPATIENT
Start: 2019-08-02 | End: 2019-08-03

## 2019-08-02 RX ORDER — CHLORHEXIDINE GLUCONATE 213 G/1000ML
1 SOLUTION TOPICAL
Refills: 0 | Status: DISCONTINUED | OUTPATIENT
Start: 2019-08-02 | End: 2019-08-03

## 2019-08-02 RX ORDER — NOREPINEPHRINE BITARTRATE/D5W 8 MG/250ML
0.05 PLASTIC BAG, INJECTION (ML) INTRAVENOUS
Qty: 8 | Refills: 0 | Status: DISCONTINUED | OUTPATIENT
Start: 2019-08-02 | End: 2019-08-03

## 2019-08-02 RX ORDER — MIDAZOLAM HYDROCHLORIDE 1 MG/ML
2 INJECTION, SOLUTION INTRAMUSCULAR; INTRAVENOUS ONCE
Refills: 0 | Status: DISCONTINUED | OUTPATIENT
Start: 2019-08-02 | End: 2019-08-02

## 2019-08-02 RX ORDER — MIDAZOLAM HYDROCHLORIDE 1 MG/ML
6 INJECTION, SOLUTION INTRAMUSCULAR; INTRAVENOUS ONCE
Refills: 0 | Status: DISCONTINUED | OUTPATIENT
Start: 2019-08-02 | End: 2019-08-02

## 2019-08-02 RX ORDER — MIDAZOLAM HYDROCHLORIDE 1 MG/ML
0.04 INJECTION, SOLUTION INTRAMUSCULAR; INTRAVENOUS
Qty: 100 | Refills: 0 | Status: DISCONTINUED | OUTPATIENT
Start: 2019-08-02 | End: 2019-08-03

## 2019-08-02 RX ORDER — FOLIC ACID 0.8 MG
1 TABLET ORAL DAILY
Refills: 0 | Status: DISCONTINUED | OUTPATIENT
Start: 2019-08-02 | End: 2019-08-06

## 2019-08-02 RX ORDER — NICOTINE POLACRILEX 2 MG
1 GUM BUCCAL DAILY
Refills: 0 | Status: DISCONTINUED | OUTPATIENT
Start: 2019-08-02 | End: 2019-08-06

## 2019-08-02 RX ORDER — SODIUM CHLORIDE 9 MG/ML
1000 INJECTION INTRAMUSCULAR; INTRAVENOUS; SUBCUTANEOUS ONCE
Refills: 0 | Status: COMPLETED | OUTPATIENT
Start: 2019-08-02 | End: 2019-08-02

## 2019-08-02 RX ADMIN — CHLORHEXIDINE GLUCONATE 1 APPLICATION(S): 213 SOLUTION TOPICAL at 12:07

## 2019-08-02 RX ADMIN — FENTANYL CITRATE 100 MICROGRAM(S): 50 INJECTION INTRAVENOUS at 14:00

## 2019-08-02 RX ADMIN — Medication 100 MILLIGRAM(S): at 12:06

## 2019-08-02 RX ADMIN — FENTANYL CITRATE 7.93 MICROGRAM(S)/KG/HR: 50 INJECTION INTRAVENOUS at 20:05

## 2019-08-02 RX ADMIN — Medication 1 PATCH: at 20:07

## 2019-08-02 RX ADMIN — SODIUM CHLORIDE 1000 MILLILITER(S): 9 INJECTION INTRAMUSCULAR; INTRAVENOUS; SUBCUTANEOUS at 02:53

## 2019-08-02 RX ADMIN — FENTANYL CITRATE 100 MICROGRAM(S): 50 INJECTION INTRAVENOUS at 14:44

## 2019-08-02 RX ADMIN — PANTOPRAZOLE SODIUM 10 MG/HR: 20 TABLET, DELAYED RELEASE ORAL at 07:49

## 2019-08-02 RX ADMIN — FENTANYL CITRATE 100 MICROGRAM(S): 50 INJECTION INTRAVENOUS at 13:30

## 2019-08-02 RX ADMIN — MIDAZOLAM HYDROCHLORIDE 6 MILLIGRAM(S): 1 INJECTION, SOLUTION INTRAMUSCULAR; INTRAVENOUS at 13:50

## 2019-08-02 RX ADMIN — Medication 75 MILLIGRAM(S): at 13:02

## 2019-08-02 RX ADMIN — PROPOFOL 40 MILLIGRAM(S): 10 INJECTION, EMULSION INTRAVENOUS at 13:02

## 2019-08-02 RX ADMIN — Medication 1 PATCH: at 14:57

## 2019-08-02 RX ADMIN — PANTOPRAZOLE SODIUM 10 MG/HR: 20 TABLET, DELAYED RELEASE ORAL at 20:06

## 2019-08-02 RX ADMIN — MIDAZOLAM HYDROCHLORIDE 3 MG/KG/HR: 1 INJECTION, SOLUTION INTRAMUSCULAR; INTRAVENOUS at 20:06

## 2019-08-02 RX ADMIN — ONDANSETRON 4 MILLIGRAM(S): 8 TABLET, FILM COATED ORAL at 00:39

## 2019-08-02 RX ADMIN — Medication 10 MILLIGRAM(S): at 12:06

## 2019-08-02 RX ADMIN — CHLORHEXIDINE GLUCONATE 15 MILLILITER(S): 213 SOLUTION TOPICAL at 16:56

## 2019-08-02 RX ADMIN — MIDAZOLAM HYDROCHLORIDE 8 MILLIGRAM(S): 1 INJECTION, SOLUTION INTRAMUSCULAR; INTRAVENOUS at 13:00

## 2019-08-02 RX ADMIN — OCTREOTIDE ACETATE 50 MICROGRAM(S): 200 INJECTION, SOLUTION INTRAVENOUS; SUBCUTANEOUS at 00:09

## 2019-08-02 RX ADMIN — PROPOFOL 23.79 MICROGRAM(S)/KG/MIN: 10 INJECTION, EMULSION INTRAVENOUS at 20:06

## 2019-08-02 RX ADMIN — MIDAZOLAM HYDROCHLORIDE 2 MILLIGRAM(S): 1 INJECTION, SOLUTION INTRAMUSCULAR; INTRAVENOUS at 13:01

## 2019-08-02 RX ADMIN — PROPOFOL 40 MILLIGRAM(S): 10 INJECTION, EMULSION INTRAVENOUS at 13:05

## 2019-08-02 NOTE — H&P ADULT - PROBLEM SELECTOR PLAN 6
-drinks 5-6 beers/day  -no prior hx w/d; however, pt states she has never stopped drinking  -will c/w low-risk IV ativan taper (given NPO, trying to avoid PO) for now

## 2019-08-02 NOTE — H&P ADULT - HISTORY OF PRESENT ILLNESS
58F w/ heavy ETOH use, daily smoker, HTN, and anxiety who presents to the ED w/ dizziness and nausea x 1 week. 58F w/ heavy ETOH use, daily smoker, HTN, and anxiety who presents to the ED w/ dizziness and nausea x 1 week. Pt states she has been feeling dizzy for the past week. She developed nausea over the past couple of days and vomiting once at night; the vomit was green without any streaks of blood. Has also noticed small black liquidy stools for the past 3days but never prior to this. Has also been having left sided CP w/ some palpitations over the past few days. Of note, pt has been drinking 5-6cans of beers daily (last drink 8/1 at 4pm) and has been smoking 10cigs daily for >40yrs.     ED course:  /71 (became hypotensive to SBPs 80s) , tachy to 120s  RR 18  97%RA  Found to have Hgb 5.9, given 1u pRBC with repeat Hgb 6.1. 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 IR consulted, recommended evaluation by GI and possible EGD, prior to the more invasive options such as angiography/embolization.  While in the ED, patient had an episode of hematemesis and melena. Started on octreotide and PPI gtt. MICU consulted for GIB, deemed not a candidate given HD stable. Per GI, plan for EGD once adequately resuscitated.

## 2019-08-02 NOTE — PROGRESS NOTE ADULT - SUBJECTIVE AND OBJECTIVE BOX
Patient examined and care reviewed in detail on bedside rounds  Critically ill with severe UGIB For intubation and endoscopy  Frequent bedside visits with therapy change today.   I have personally provided 35+ minutes of critical care time concurrently with the resident/fellow; this excludes time spent on separate procedures.

## 2019-08-02 NOTE — H&P ADULT - PROBLEM SELECTOR PLAN 1
p/w tachycardia to 120s, leukocytosis to 14  -suspect 2/2 acute GIB and multiple episodes of vomiting  -low suspicion for infection, including for aspiration  -may consider blood cx's if pt becomes febrile  -will continue to monitor as pt receives IVF resuscitation

## 2019-08-02 NOTE — CHART NOTE - NSCHARTNOTEFT_GEN_A_CORE
MICU Accept Note    HPI / INTERVAL HISTORY:  58F w/ heavy ETOH use, daily smoker, HTN, and anxiety who presents to the ED w/ dizziness and nausea x 1 week. Pt states she has been feeling dizzy for the past week. She developed nausea over the past couple of days and vomiting once at night; the vomit was green without any streaks of blood. Has also noticed small black liquidy stools for the past 3days but never prior to this. Has also been having left sided CP w/ some palpitations over the past few days. Of note, pt has been drinking 5-6cans of beers daily (last drink 8/1 at 4pm) and has been smoking 10cigs daily for >40yrs.     ED course:  /71 (became hypotensive to SBPs 80s) , tachy to 120s  RR 18  97%RA  Found to have Hgb 5.9, given 1u pRBC with repeat Hgb 6.1. 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 IR consulted, recommended evaluation by GI and possible EGD, prior to the more invasive options such as angiography/embolization.  While in the ED, patient had an episode of hematemesis and melena. Started on octreotide and PPI gtt. Per GI, plan for EGD once adequately resuscitated. Admitted to MICU for elective intubation for endoscopy.       PAST MEDICAL & SURGICAL HISTORY:  Hypertension  ETOH abuse  Anxiety  H/O hernia repair      FAMILY HISTORY:  FH: hypertension: mother      SOCIAL HISTORY:  lives w/ daughter and her family in [place]  travels to [place], where her  and son live every few mths; pharmacy and doctor are there too  has been drinking 5-6beers/day since "forever"  has been smoking 10 cigs/day since "forever"  smokes marijuana occasionally  denies IVDU or other rec drug use    HOME MEDICATIONS:  Atenolol 50mg/day  Lisinopril 20/day    Allergies    No Known Allergies    REVIEW OF SYSTEMS:    CONSTITUTIONAL: (+) weakness, NO fevers or chills  EYES/ENT: No visual changes; No vertigo or throat pain   NECK: No pain or stiffness  RESPIRATORY: No cough, wheezing, hemoptysis; No shortness of breath  CARDIOVASCULAR: No chest pain (+) palpitations   GASTROINTESTINAL: No abdominal or epigastric pain. (+) N/V (+) hematemesis; No diarrhea or constipation. (+) melena; NO hematochezia.  GENITOURINARY: No dysuria, frequency or hematuria  NEUROLOGICAL: No numbness or weakness  SKIN: No itching, rashes    OBJECTIVE:  ICU Vital Signs Last 24 Hrs  T(C): 36.9 (02 Aug 2019 05:49), Max: 37.3 (02 Aug 2019 05:19)  T(F): 98.5 (02 Aug 2019 05:49), Max: 99.1 (02 Aug 2019 05:19)  HR: 88 (02 Aug 2019 05:49) (74 - 135)  BP: 123/69 (02 Aug 2019 05:49) (83/61 - 123/69)  BP(mean): --  ABP: --  ABP(mean): --  RR: 12 (02 Aug 2019 05:49) (12 - 18)  SpO2: 100% (02 Aug 2019 05:49) (97% - 100%)        CAPILLARY BLOOD GLUCOSE      POCT Blood Glucose.: 130 mg/dL (01 Aug 2019 21:48)    PHYSICAL EXAM:  GENERAL: No acute distress, well-developed  HEAD:  Atraumatic, Normocephalic  EYES: EOMI, PERRLA, conjunctiva and sclera clear  NECK: Supple, no lymphadenopathy, no JVD  CHEST/LUNG: CTAB; No wheezes, rales, or rhonchi  HEART: Regular rate and rhythm; No murmurs, rubs, or gallops  ABDOMEN: Soft, non-tender, non-distended; normal bowel sounds, no organomegaly  EXTREMITIES:  2+ peripheral pulses b/l, No clubbing, cyanosis, or edema; no tremor or asterixis  NEUROLOGY: A&O x 3, no focal deficits  SKIN: No rashes or lesions    HOSPITAL MEDICATIONS:  MEDICATIONS  (STANDING):  folic acid 1 milliGRAM(s) Oral daily  multivitamin 1 Tablet(s) Oral daily  nicotine - 21 mG/24Hr(s) Patch 1 patch Transdermal daily  pantoprazole Infusion 8 mG/Hr (10 mL/Hr) IV Continuous <Continuous>  thiamine Injectable 100 milliGRAM(s) IV Push daily    MEDICATIONS  (PRN):  LORazepam   Injectable 2 milliGRAM(s) IV Push every 2 hours PRN CIWA-Ar score increase by 2 points and a total score of 7 or less  LORazepam   Injectable 2 milliGRAM(s) IV Push every 1 hour PRN Symptom-triggered: each CIWA -Ar score 8 or GREATER      LABS:                        6.1    14.04 )-----------( 165      ( 02 Aug 2019 02:50 )             18.4     Hgb Trend: 6.1<--, 5.9<--  08-01    133<L>  |  98  |  42<H>  ----------------------------<  139<H>  4.3   |  22  |  0.90    Ca    9.1      01 Aug 2019 21:25    TPro  5.9<L>  /  Alb  3.5  /  TBili  < 0.2<L>  /  DBili  x   /  AST  25  /  ALT  13  /  AlkPhos  59  08-01    Creatinine Trend: 0.90<--  PT/INR - ( 01 Aug 2019 21:35 )   PT: 11.2 SEC;   INR: 0.98          PTT - ( 01 Aug 2019 21:35 )  PTT:21.6 SEC      Venous Blood Gas:  08-01 @ 21:50  7.44/35/26/24/43.4  VBG Lactate: 2.5      MICROBIOLOGY:     RADIOLOGY & ADDITIONAL TESTS:    Assessment and Plan    58 y.o Stateless speaking female with a PMHx of HTN, current smoker and chronic drinker (6 beers/day for many years) presents to the ED for dizziness and vomiting, found to have hematemesis and melena in ED with Hgb of 5.9 2/2 GIB. MICU consulted for GIB.     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  -IR consulted- recommending GI endoscopic evaluation prior to invasive procedure such as embolization   -patient admitted to MICU for elective intubation for endoscopy by GI   -Surgical evaluation, no interventions   -s/p 5 units pRBC, transfuse PRN   -continue protonix gtt   -GI recs appreciated  -patient is an active drinker, monitor for alcohol withdrawal     #ID  -leukocytosis and tachycardia likely 2/2 GIB  -unlikely infection, no localizing sx or fever   -will pan culture if febrile     #  -no active issues    #Endo  -no active issues    #Heme  -anemia 2/2 to GIB   -continue to transfuse PRN    #DVT ppx  -hold in setting of active bleed

## 2019-08-02 NOTE — CONSULT NOTE ADULT - SUBJECTIVE AND OBJECTIVE BOX
HPI:  58 y.o female with a PMHx of HTN, current smoker and chronic drinker (6 beers/day for many years) presents to the ED complaining of having dizziness and headache on and off over the past month along with chest pain over the past 2 days. Patient states she started to have a generalized headache on and off and describes her dizziness as a spinning sensation. Patient also admits to having left sided chest pain over the past few days, which she describes as her heart poinding. She also endorses bilious vomiting over the past couple months. As per the patient, she endorses these symptoms began with the addition of Lisinopril and Atenolol last month. Over the past couple days, however, her dizziness and vomiting became worse, prompting her to come here. The patient denuie Pt also admits to having associated shortness of breath. patient denies having any fever, chills, abdominal pain.        PAST MEDICAL & SURGICAL HISTORY:      FAMILY HISTORY:      SOCIAL HISTORY:  Smoking: __ packs x ___ years  EtOH Use:  Marital Status:  Occupation:  Recent Travel:  Country of Birth:  Advance Directives:    Allergies    No Known Allergies    Intolerances        HOME MEDICATIONS:    REVIEW OF SYSTEMS:  Constitutional:   Eyes:  ENT:  CV:  Resp:  GI:  :  MSK:  Integumentary:  Neurological:  Psychiatric:  Endocrine:  Hematologic/Lymphatic:  Allergic/Immunologic:  [ ] All other systems negative  [ ] Unable to assess ROS because ________    OBJECTIVE:  ICU Vital Signs Last 24 Hrs  T(C): 36.6 (01 Aug 2019 21:49), Max: 36.6 (01 Aug 2019 21:49)  T(F): 97.8 (01 Aug 2019 21:49), Max: 97.8 (01 Aug 2019 21:49)  HR: 74 (01 Aug 2019 22:46) (74 - 135)  BP: 99/74 (01 Aug 2019 22:46) (83/61 - 105/71)  BP(mean): --  ABP: --  ABP(mean): --  RR: 14 (01 Aug 2019 22:46) (14 - 18)  SpO2: 100% (01 Aug 2019 22:46) (97% - 100%)        CAPILLARY BLOOD GLUCOSE      POCT Blood Glucose.: 130 mg/dL (01 Aug 2019 21:48)      PHYSICAL EXAM:  General:   HEENT:   Lymph Nodes:  Neck:   Respiratory:   Cardiovascular:   Abdomen:   Extremities:   Skin:   Neurological:  Psychiatry:    HOSPITAL MEDICATIONS:  MEDICATIONS  (STANDING):  ondansetron Injectable 4 milliGRAM(s) IV Push once  pantoprazole Infusion 8 mG/Hr (10 mL/Hr) IV Continuous <Continuous>    MEDICATIONS  (PRN):      LABS:                        5.9    8.64  )-----------( 273      ( 01 Aug 2019 21:25 )             17.9     08-01    133<L>  |  98  |  42<H>  ----------------------------<  139<H>  4.3   |  22  |  0.90    Ca    9.1      01 Aug 2019 21:25    TPro  5.9<L>  /  Alb  3.5  /  TBili  < 0.2<L>  /  DBili  x   /  AST  25  /  ALT  13  /  AlkPhos  59  08-01    PT/INR - ( 01 Aug 2019 21:35 )   PT: 11.2 SEC;   INR: 0.98          PTT - ( 01 Aug 2019 21:35 )  PTT:21.6 SEC      Venous Blood Gas:  08-01 @ 21:50  7.44/35/26/24/43.4  VBG Lactate: 2.5      MICROBIOLOGY:     RADIOLOGY:  [ ] Reviewed and interpreted by me    EKG: HPI:  58 y.o female with a PMHx of HTN, current smoker and chronic drinker (6 beers/day for many years) presents to the ED complaining of having dizziness and headache on and off over the past month along with chest pain over the past 2 days. Patient states she started to have a generalized headache on and off and describes her dizziness as a spinning sensation. Patient also admits to having left sided chest pain over the past few days, which she describes as her heart poinding. She also endorses bilious vomiting over the past couple months. As per the patient, she endorses these symptoms began with the addition of Lisinopril and Atenolol last month. Over the past couple days, however, her dizziness and vomiting became worse, prompting her to come here. Patient denies having any fever, chills, abdominal pain, or any prior episodes. While in the ED, patient had an episode of hematemesis and melena. Received 2 units pRBC and started on octreotide and PPI gtt. MICU consulted for GIB.    PAST MEDICAL & SURGICAL HISTORY:  -HTN  -no prior surgeries    FAMILY HISTORY: non-contributory    SOCIAL HISTORY:  Smokin pack every 2 days for >30 yrs  Drinks 6-8 beers daily for the past 30 years  No illicit drug use    Allergies    No Known Allergies    HOME MEDICATIONS:  -Atenolol 50 mg/Day  -Lisinopril 20 mg/day    REVIEW OF SYSTEMS:    CONSTITUTIONAL: (+) weakness, NO fevers or chills  EYES/ENT: No visual changes; No vertigo or throat pain   NECK: No pain or stiffness  RESPIRATORY: No cough, wheezing, hemoptysis; No shortness of breath  CARDIOVASCULAR: No chest pain (+) palpitations   GASTROINTESTINAL: No abdominal or epigastric pain. (+) N/V (+) hematemesis; No diarrhea or constipation. (+) melena; NO hematochezia.  GENITOURINARY: No dysuria, frequency or hematuria  NEUROLOGICAL: No numbness or weakness  SKIN: No itching, rashes      OBJECTIVE:  ICU Vital Signs Last 24 Hrs  T(C): 36.6 (01 Aug 2019 21:49), Max: 36.6 (01 Aug 2019 21:49)  T(F): 97.8 (01 Aug 2019 21:49), Max: 97.8 (01 Aug 2019 21:49)  HR: 74 (01 Aug 2019 22:46) (74 - 135)  BP: 99/74 (01 Aug 2019 22:46) (83/61 - 105/71)  BP(mean): --  ABP: --  ABP(mean): --  RR: 14 (01 Aug 2019 22:46) (14 - 18)  SpO2: 100% (01 Aug 2019 22:46) (97% - 100%)        CAPILLARY BLOOD GLUCOSE      POCT Blood Glucose.: 130 mg/dL (01 Aug 2019 21:48)    PHYSICAL EXAM:  GENERAL: No acute distress, well-developed  HEAD:  Atraumatic, Normocephalic  EYES: EOMI, PERRLA, conjunctiva and sclera clear  NECK: Supple, no lymphadenopathy, no JVD  CHEST/LUNG: CTAB; No wheezes, rales, or rhonchi  HEART: Regular rate and rhythm; No murmurs, rubs, or gallops  ABDOMEN: Soft, non-tender, non-distended; normal bowel sounds, no organomegaly  EXTREMITIES:  2+ peripheral pulses b/l, No clubbing, cyanosis, or edema; no tremor or asterixis  NEUROLOGY: A&O x 3, no focal deficits  SKIN: No rashes or lesions    HOSPITAL MEDICATIONS:  MEDICATIONS  (STANDING):  ondansetron Injectable 4 milliGRAM(s) IV Push once  pantoprazole Infusion 8 mG/Hr (10 mL/Hr) IV Continuous <Continuous>    MEDICATIONS  (PRN):      LABS:                        5.9    8.64  )-----------( 273      ( 01 Aug 2019 21:25 )             17.9     08    133<L>  |  98  |  42<H>  ----------------------------<  139<H>  4.3   |  22  |  0.90    Ca    9.1      01 Aug 2019 21:25    TPro  5.9<L>  /  Alb  3.5  /  TBili  < 0.2<L>  /  DBili  x   /  AST  25  /  ALT  13  /  AlkPhos  59      PT/INR - ( 01 Aug 2019 21:35 )   PT: 11.2 SEC;   INR: 0.98          PTT - ( 01 Aug 2019 21:35 )  PTT:21.6 SEC      Venous Blood Gas:   @ 21:50  7.44/35/26/24/43.4  VBG Lactate: 2.5

## 2019-08-02 NOTE — CONSULT NOTE ADULT - SUBJECTIVE AND OBJECTIVE BOX
HPI:  58F w/ heavy ETOH use, daily smoker, HTN, and anxiety who presents to the ED w/ dizziness and nausea x 1 week. Pt states she has been feeling dizzy for the past week. She developed nausea over the past couple of days and vomiting once at night; the vomit was green without any streaks of blood. Has also noticed small black liquidy stools for the past 3days but never prior to this. Has also been having left sided CP w/ some palpitations over the past few days. Of note, pt has been drinking 5-6cans of beers daily (last drink 8/1 at 4pm) and has been smoking 10cigs daily for >40yrs.     ED course:  /71 (became hypotensive to SBPs 80s) , tachy to 120s  RR 18  97%RA  Found to have Hgb 5.9, given 1u pRBC with repeat Hgb 6.1. 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 IR consulted, recommended evaluation by GI and possible EGD, prior to the more invasive options such as angiography/embolization.  While in the ED, patient had an episode of hematemesis and melena. Started on octreotide and PPI gtt. MICU consulted for GIB, deemed not a candidate given HD stable. Per GI, plan for EGD once adequately resuscitated. (02 Aug 2019 06:02)      No Known Allergies      PAST MEDICAL & SURGICAL HISTORY:  Hypertension  ETOH abuse  Anxiety  H/O hernia repair        Home Medications:  atenolol 50 mg oral tablet: 1 tab(s) orally once a day (02 Aug 2019 05:53)  lisinopril 30 mg oral tablet: 1 tab(s) orally once a day (02 Aug 2019 05:53)      Social History:  ETOH: drinks 6-7 beers daily  TOB: 0.5ppd x35 years   Illicits: marijuana occasionaly  Work/Home: lives at home with family    FAMILY HISTORY:  FH: hypertension: mother      REVIEW OF SYSTEMS:  A ten point review of systems was otherwise negative.          MEDICATIONS  (STANDING):  folic acid 1 milliGRAM(s) Oral daily  multivitamin 1 Tablet(s) Oral daily  nicotine - 21 mG/24Hr(s) Patch 1 patch Transdermal daily  pantoprazole Infusion 8 mG/Hr (10 mL/Hr) IV Continuous <Continuous>  thiamine Injectable 100 milliGRAM(s) IV Push daily    MEDICATIONS  (PRN):  LORazepam   Injectable 2 milliGRAM(s) IV Push every 2 hours PRN CIWA-Ar score increase by 2 points and a total score of 7 or less  LORazepam   Injectable 2 milliGRAM(s) IV Push every 1 hour PRN Symptom-triggered: each CIWA -Ar score 8 or GREATER        Vital Signs Last 24 Hrs  T(C): 36.9 (02 Aug 2019 05:49), Max: 37.3 (02 Aug 2019 05:19)  T(F): 98.5 (02 Aug 2019 05:49), Max: 99.1 (02 Aug 2019 05:19)  HR: 88 (02 Aug 2019 05:49) (74 - 135)  BP: 123/69 (02 Aug 2019 05:49) (83/61 - 123/69)  BP(mean): --  RR: 12 (02 Aug 2019 05:49) (12 - 18)  SpO2: 100% (02 Aug 2019 05:49) (97% - 100%)    General: NAD, Pleasant  Neurology: Patient is AA&Ox4, follows commands, and speech fluent. EOMI intact, PERRL  Neck: Neck supple, trachea midline, No JVD  Respiratory: CTA B/L, (-)rales, rhonchi  CV: S1S2, r/r/r, (-)m/r/g  Abdomen: S/NT/ND, BSx4  Extremities: 2+ peripheral pulses bilat throughout; (-)edema appreciated      LABS:                        6.1    14.04 )-----------( 165      ( 02 Aug 2019 02:50 )             18.4     08-01    133<L>  |  98  |  42<H>  ----------------------------<  139<H>  4.3   |  22  |  0.90    Ca    9.1      01 Aug 2019 21:25    TPro  5.9<L>  /  Alb  3.5  /  TBili  < 0.2<L>  /  DBili  x   /  AST  25  /  ALT  13  /  AlkPhos  59  08-01    PT/INR - ( 01 Aug 2019 21:35 )   PT: 11.2 SEC;   INR: 0.98          PTT - ( 01 Aug 2019 21:35 )  PTT:21.6 SEC                RADIOLOGY & ADDITIONAL STUDIES:  CT Angio Abdomen and Pelvis w/ IV Cont (08.02.19 @ 01:42) >  FINDINGS:    LOWER CHEST: Within normal limits.    LIVER: Within normal limits.  BILE DUCTS: Normal caliber.  GALLBLADDER: Within normal limits.  SPLEEN: Within normal limits.  PANCREAS: Within normal limits.  ADRENALS: Within normal limits.  KIDNEYS/URETERS: Left renal cyst. Subcentimeter hypodense foci in the   kidneys bilaterally, too small to characterize. Left peripelvic renal   cyst.    BLADDER: Within normal limits.  REPRODUCTIVE ORGANS: Uterus and adnexa within normal limits.    BOWEL: Focal contrast blush in the gastric body along the greater   curvature which follows blood pool on arterial and venous phases   suspicious for pseudoaneurysm. Contrast pooling along the gastric wall on   venous phase indicative of active bleeding. No bowel obstruction.  PERITONEUM: Noascites. No pneumoperitoneum. Calcified mesenteric nodule   in the right midabdomen may be from a focus of fat necrosis.  VESSELS: Within normal limits.  RETROPERITONEUM/LYMPH NODES: Few nonspecific subcentimeter gastrohepatic   and greater omental lymph nodes.    ABDOMINAL WALL: Within normal limits.  BONES: Degenerative changes of the spine.    IMPRESSION:     Pseudoaneurysm arising from the anterior gastric body with active   bleeding.  1.

## 2019-08-02 NOTE — PHYSICAL THERAPY INITIAL EVALUATION ADULT - ADDITIONAL COMMENTS
Patient lives with her daughter in a private house, 2 steps to enter and 4 steps within. Patient reports she was previously independent in all ADLs and did not use an assistive device for ambulation.     Patient was left semi-supine in bed as found, all lines/tubes intact and call aguilar within reach, MICHELA antonio

## 2019-08-02 NOTE — H&P ADULT - NSHPPHYSICALEXAM_GEN_ALL_CORE
PHYSICAL EXAM:  T(C): 36.9 (08-02-19 @ 05:49), Max: 37.3 (08-02-19 @ 05:19)  HR: 88 (08-02-19 @ 05:49) (74 - 135)  BP: 123/69 (08-02-19 @ 05:49) (83/61 - 123/69)  RR: 12 (08-02-19 @ 05:49) (12 - 18)  SpO2: 100% (08-02-19 @ 05:49) (97% - 100%)    GENERAL: appears older than stated age, pleasant and in NAD, speaks in full sentences, no signs of respiratory distress  HEAD:  Atraumatic, Normocephalic  EYES: EOMI, PERRLA, conjunctiva and sclera clear  NECK: Supple, No JVD  CHEST/LUNG: Clear to auscultation bilaterally; No wheeze; No crackles; No accessory muscles used  HEART: Regular rate and rhythm; + 3/6 systolic murmur rad to carotids  ABDOMEN: Soft, Nontender, Nondistended; Bowel sounds present; No guarding. noted to have dark red blood near anal region/wolf  EXTREMITIES:  2+ Peripheral Pulses, No cyanosis or edema  PSYCH: AAOx3  NEUROLOGY: non-focal  SKIN: No rashes or lesions PHYSICAL EXAM:  T(C): 36.9 (08-02-19 @ 05:49), Max: 37.3 (08-02-19 @ 05:19)  HR: 88 (08-02-19 @ 05:49) (74 - 135)  BP: 123/69 (08-02-19 @ 05:49) (83/61 - 123/69)  RR: 12 (08-02-19 @ 05:49) (12 - 18)  SpO2: 100% (08-02-19 @ 05:49) (97% - 100%)    GENERAL: appears older than stated age, pleasant and in NAD, speaks in full sentences, no signs of respiratory distress  HEAD:  Atraumatic, Normocephalic  EYES: EOMI, PERRLA, conjunctiva and sclera pallor  NECK: Supple, No JVD  CHEST/LUNG: Clear to auscultation bilaterally; No wheeze; No crackles; No accessory muscles used  HEART: Regular rate and rhythm; + 3/6 systolic murmur rad to carotids  ABDOMEN: Soft, Nontender, Nondistended; Bowel sounds present; No guarding. noted to have dark red blood near anal region/wolf  EXTREMITIES:  2+ Peripheral Pulses, No cyanosis or edema  PSYCH: AAOx3  NEUROLOGY: non-focal  SKIN: No rashes or lesions

## 2019-08-02 NOTE — H&P ADULT - PROBLEM SELECTOR PLAN 2
p/w Hgb 5.9, found to have hematemesis and hematochezia c/w brisk upper GIB  -found to have bleeding within the gastric antrum on CTA, likely source of bleed  -received octreotide 50IV x1  -s/p 80IV pantoprazole, now on PPI gtt  -appreciate MICU, IR, GI eval  -will continue to transfuse as needed, pending EGD/colonoscopy if Hgb improves to >7.0 given pt currently HD stable  -keep NPO p/w Hgb 5.9, found to have hematemesis and hematochezia c/w brisk upper GIB  -found to have bleeding within the gastric antrum on CTA, likely source of bleed  -received octreotide 50IV x1  -s/p 80IV pantoprazole, now on PPI gtt  -appreciate MICU, IR, GI eval, not a candidate for IR embolization or MICU given HDS teresa  -will continue to transfuse as needed, pending EGD/colonoscopy if Hgb improves to >7.0 given pt currently HD stable  -Gen surg consulted, f/u recs  -keep NPO

## 2019-08-02 NOTE — CHART NOTE - NSCHARTNOTEFT_GEN_A_CORE
GI    Consult for Hematemesis  VSS currently stable, no more episodes of bleeding     Hb 5.1 s/p 2u pRBC and repeat Hb is 6.1  CTA: pseudoaneurysm in the gastric body, along the greater curvature, with active bleeding      Recommendations  - trend CBC, transfuse to Hb>7  - continue PPI infusion at 8mg/hr  - recommend surgery and IR consult  - once she has been resuscitated, will plan for an upper endoscopy  - patient will needed to be intubated for the procedure  - recommend ICU evaluation for the patient     Case discussed with attending  Full consult note to follow     Dahlia Morales, PGY-6  GI fellow  B- [id number]/ [phone]  Please call GI fellow on call after 5pm and on weekends

## 2019-08-02 NOTE — CONSULT NOTE ADULT - ASSESSMENT
This is a 57 y/o woman with ETOH use disorder, daily smoker, HTN, and anxiety who presents to the ED w/ dizziness and nausea x 1 week and upon walking to the restroom, she had a large bloody bowel movement, and subsequently had a large volume hematemesis with resultant hypotension.  Pt given 4u PRBCs, octreotide and protonix gtt.  GI requesting surgery back-up in case unable to control pseudoaneurysm endoscopically.    -No acute surgical intervention warranted  -Care as per GI and/Or IR for Upper GIB  -General Surgery will be available for emergent back up  -D/w Dr. Lynne

## 2019-08-02 NOTE — H&P ADULT - ATTENDING COMMENTS
59 y/o female HX of HTN, + smoker, + ETOH abuse, admitted for GI Bleed, + HA, + Dizziness, + CP, + N/V, Symptomatic, + Hematemesis and BRBPR in the ER, Hgb 6.1, initially was rejected by MICU, on TELE, S/P IV Ceftriaxone, Protonix Drip, IV Octreotide, S/P PRBC x 4 units, Plasma x one unit, Platelet TX x one unit, seen by Surgery, IR, and GI, needs EGD by GI, F/U CBC, BMP Closely, CT Angio Abdomen : Pseudoaneurysm arising from Ant. Gastric Body with active Bleeding, + Family HX of ETOH abuse and HTN, on CIWA, IV Ativan PRN, NPO, fall/seizure/aspiration precaution, Venodyne for DVT Prophylaxis, Thiamin 100 mg IVP QD x 3 days, folic acid, MVI, Hold BP Meds, Pt was accepted to MICU, Vitals Q 4 HR,     Case D/W Pt, Pt's daughter, HS, MICU, Nursing, ER,    Pt was seen by me, Dr. MADDY Aguillon on 8/2/19.

## 2019-08-02 NOTE — PHYSICAL THERAPY INITIAL EVALUATION ADULT - PERTINENT HX OF CURRENT PROBLEM, REHAB EVAL
Patient is a 58 year old female admitted to [facility] on 8/2 with dizziness and nausea x 1 week. PMH: heavy ETOH use, daily smoker, HTN, and anxiety.

## 2019-08-02 NOTE — PROGRESS NOTE ADULT - SUBJECTIVE AND OBJECTIVE BOX
Interventional radiology consulted for evaluation for upper GI bleed. Chart and imaging reviewed, and case d/w with Dr. Peralta of the ED. Patient is suspected to have an active gastric bleed. Patient is being transfused and remains hemodynamically stable at this time, as per the primary team.   -Continue resuscitation and transfusions with blood products as needed  -Would recommend evaluation by GI and possible EGD, prior to the more invasive options such as angiography/embolization.   -Please call us back if clinical situation changes and/or new information becomes available.

## 2019-08-02 NOTE — H&P ADULT - PROBLEM SELECTOR PLAN 5
-pt states she was started on a medication for anxiety by her PCP, thought it was her atenolol   -ISTOP neg for anxiety meds including benzos  -will continue to monitor for now

## 2019-08-02 NOTE — CONSULT NOTE ADULT - SUBJECTIVE AND OBJECTIVE BOX
Chief Complaint:  Patient is a 58y old  Female who presents with a chief complaint of dizziness (02 Aug 2019 07:15)      HPI:  58F w/ heavy ETOH use, daily smoker, HTN, and anxiety who presents to the ED w/ dizziness and nausea x 1 week found ot have melena and hematemesisi n the ED now with suspicious pseudoaneurysm on CT. GI consulted for evaluation.    Pt states she has been feeling dizzy for the past week. She developed nausea over the past couple of days and vomiting once at night; the vomit was green without any streaks of blood. Has also noticed small black liquidy stools for the past 3days but never prior to this. Has also been having left sided CP w/ some palpitations over the past few days. Of note, pt has been drinking 5-6cans of beers daily (last drink 8/1 at 4pm) and has been smoking 10cigs daily for >40yrs.     ED course:  /71 (became hypotensive to SBPs 80s) , tachy to 120s  RR 18  97%RA  Found to have Hgb 5.9. 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 IR consulted, recommended evaluation by GI and possible EGD, prior to the more invasive options such as angiography/embolization.  While in the ED, patient had an episode of hematemesis and melena. Started on octreotide and PPI gtt. s/p 5 u PRBC with appropriate response. Repeat Hb of 10.2. Currently patient is stable.    Allergies:  No Known Allergies      Home Medications:    Hospital Medications:  chlorhexidine 4% Liquid 1 Application(s) Topical <User Schedule>  folic acid 1 milliGRAM(s) Oral daily  LORazepam   Injectable 2 milliGRAM(s) IV Push every 2 hours PRN  LORazepam   Injectable 2 milliGRAM(s) IV Push every 1 hour PRN  metoclopramide Injectable 10 milliGRAM(s) IV Push once  multivitamin 1 Tablet(s) Oral daily  nicotine - 21 mG/24Hr(s) Patch 1 patch Transdermal daily  pantoprazole Infusion 8 mG/Hr IV Continuous <Continuous>  thiamine Injectable 100 milliGRAM(s) IV Push daily      PMHX/PSHX:  Hypertension  ETOH abuse  Anxiety  H/O hernia repair      Family history:  FH: hypertension      Social History:     ROS:     General:  No wt loss, fevers, chills, night sweats, fatigue,   Eyes:  Good vision, no reported pain  ENT:  No sore throat, pain, runny nose, dysphagia  CV:  No pain, palpitations, hypo/hypertension  Resp:  No dyspnea, cough, tachypnea, wheezing  GI:  See HPI  :  No pain, bleeding, incontinence, nocturia  Muscle:  No pain, weakness  Neuro:  No weakness, tingling, memory problems  Psych:  No fatigue, insomnia, mood problems, depression  Endocrine:  No polyuria, polydipsia, cold/heat intolerance  Heme:  No petechiae, ecchymosis, easy bruisability  Skin:  No rash, edema      PHYSICAL EXAM:     GENERAL:  Appears stated age, well-groomed, well-nourished, no distress  HEENT:  NC/AT,  conjunctivae clear and pink,  no JVD  CHEST:  Full & symmetric excursion, no increased effort, breath sounds clear  HEART:  Regular rhythm, S1, S2, no murmur/rub/S3/S4, no abdominal bruit, no edema  ABDOMEN:  Soft, non-tender, non-distended, normoactive bowel sounds,  no masses ,  EXTREMITIES:  no cyanosis,clubbing or edema  SKIN:  No rash/erythema/ecchymoses/petechiae/wounds/abscess/warm/dry  NEURO:  Alert, oriented    Vital Signs:  Vital Signs Last 24 Hrs  T(C): 36.5 (02 Aug 2019 09:30), Max: 37.3 (02 Aug 2019 05:19)  T(F): 97.7 (02 Aug 2019 09:30), Max: 99.1 (02 Aug 2019 05:19)  HR: 96 (02 Aug 2019 10:00) (74 - 135)  BP: 126/94 (02 Aug 2019 10:00) (83/61 - 136/92)  BP(mean): 105 (02 Aug 2019 10:00) (105 - 106)  RR: 20 (02 Aug 2019 10:00) (12 - 20)  SpO2: 100% (02 Aug 2019 10:00) (97% - 100%)  Daily Height in cm: 154.94 (02 Aug 2019 09:30)    Daily     LABS:                        10.2   8.89  )-----------( 195      ( 02 Aug 2019 07:08 )             29.9     08-02    138  |  109<H>  |  24<H>  ----------------------------<  159<H>  4.4   |  19<L>  |  0.71    Ca    7.4<L>      02 Aug 2019 08:01  Phos  2.9     08-02  Mg     1.9     08-02    TPro  4.5<L>  /  Alb  2.7<L>  /  TBili  0.9  /  DBili  x   /  AST  23  /  ALT  12  /  AlkPhos  46  08-02    LIVER FUNCTIONS - ( 02 Aug 2019 08:01 )  Alb: 2.7 g/dL / Pro: 4.5 g/dL / ALK PHOS: 46 u/L / ALT: 12 u/L / AST: 23 u/L / GGT: x           PT/INR - ( 01 Aug 2019 21:35 )   PT: 11.2 SEC;   INR: 0.98          PTT - ( 01 Aug 2019 21:35 )  PTT:21.6 SEC    Amylase Serum--      Lipase serum43.4       Ammonia--      Imaging: Chief Complaint:  Patient is a 58y old  Female who presents with a chief complaint of dizziness (02 Aug 2019 07:15)      HPI:  58F w/ heavy ETOH use, daily smoker, HTN, and anxiety who presents to the ED w/ dizziness and nausea x 1 week found ot have melena and hematemesisi n the ED now with suspicious pseudoaneurysm on CT. GI consulted for evaluation.    Pt states she has been feeling dizzy for the past week. She developed nausea over the past couple of days and vomiting once at night; the vomit was green without any streaks of blood. Has also noticed small black liquidy stools for the past 3days but never prior to this. Has also been having left sided CP w/ some palpitations over the past few days. Of note, pt has been drinking 5-6cans of beers daily (last drink 8/1 at 4pm) and has been smoking 10cigs daily for >40yrs.     ED course:  /71 (became hypotensive to SBPs 80s) , tachy to 120s  RR 18  97%RA  Found to have Hgb 5.9. 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 IR consulted, recommended evaluation by GI and possible EGD, prior to the more invasive options such as angiography/embolization.  While in the ED, patient had an episode of hematemesis and melena. Started on octreotide and PPI gtt. s/p 5 u PRBC with appropriate response. Repeat Hb of 10.2. Currently patient is stable.    Allergies:  No Known Allergies      Home Medications:    Hospital Medications:  chlorhexidine 4% Liquid 1 Application(s) Topical <User Schedule>  folic acid 1 milliGRAM(s) Oral daily  LORazepam   Injectable 2 milliGRAM(s) IV Push every 2 hours PRN  LORazepam   Injectable 2 milliGRAM(s) IV Push every 1 hour PRN  metoclopramide Injectable 10 milliGRAM(s) IV Push once  multivitamin 1 Tablet(s) Oral daily  nicotine - 21 mG/24Hr(s) Patch 1 patch Transdermal daily  pantoprazole Infusion 8 mG/Hr IV Continuous <Continuous>  thiamine Injectable 100 milliGRAM(s) IV Push daily      PMHX/PSHX:  Hypertension  ETOH abuse  Anxiety  H/O hernia repair      Family history:  FH: hypertension      Social History:     ROS:     General:  No wt loss, fevers, chills, night sweats, fatigue,   Eyes:  Good vision, no reported pain  ENT:  No sore throat, pain, runny nose, dysphagia  CV:  No pain, palpitations, hypo/hypertension  Resp:  No dyspnea, cough, tachypnea, wheezing  GI:  See HPI  :  No pain, bleeding, incontinence, nocturia  Muscle:  No pain, weakness  Neuro:  No weakness, tingling, memory problems  Psych:  No fatigue, insomnia, mood problems, depression  Endocrine:  No polyuria, polydipsia, cold/heat intolerance  Heme:  No petechiae, ecchymosis, easy bruisability  Skin:  No rash, edema      PHYSICAL EXAM:     GENERAL:  NAD, sleeping comfortably in bed  HEENT:  sclera anicteric  CHEST:  Full & symmetric excursion, no increased effort  HEART:  Regular rhythm, S1, S2  ABDOMEN:  Soft, non-tender, non-distended, normoactive bowel sounds,  no masses ,  EXTREMITIES:  no cyanosis,clubbing or edema  SKIN:  No rash/erythema/ecchymoses/petechiae/wounds/abscess/warm/dry  NEURO:  Alert, oriented    Vital Signs:  Vital Signs Last 24 Hrs  T(C): 36.5 (02 Aug 2019 09:30), Max: 37.3 (02 Aug 2019 05:19)  T(F): 97.7 (02 Aug 2019 09:30), Max: 99.1 (02 Aug 2019 05:19)  HR: 96 (02 Aug 2019 10:00) (74 - 135)  BP: 126/94 (02 Aug 2019 10:00) (83/61 - 136/92)  BP(mean): 105 (02 Aug 2019 10:00) (105 - 106)  RR: 20 (02 Aug 2019 10:00) (12 - 20)  SpO2: 100% (02 Aug 2019 10:00) (97% - 100%)  Daily Height in cm: 154.94 (02 Aug 2019 09:30)    Daily     LABS:                        10.2   8.89  )-----------( 195      ( 02 Aug 2019 07:08 )             29.9     08-02    138  |  109<H>  |  24<H>  ----------------------------<  159<H>  4.4   |  19<L>  |  0.71    Ca    7.4<L>      02 Aug 2019 08:01  Phos  2.9     08-02  Mg     1.9     08-02    TPro  4.5<L>  /  Alb  2.7<L>  /  TBili  0.9  /  DBili  x   /  AST  23  /  ALT  12  /  AlkPhos  46  08-02    LIVER FUNCTIONS - ( 02 Aug 2019 08:01 )  Alb: 2.7 g/dL / Pro: 4.5 g/dL / ALK PHOS: 46 u/L / ALT: 12 u/L / AST: 23 u/L / GGT: x           PT/INR - ( 01 Aug 2019 21:35 )   PT: 11.2 SEC;   INR: 0.98          PTT - ( 01 Aug 2019 21:35 )  PTT:21.6 SEC    Amylase Serum--      Lipase serum43.4       Ammonia--      Imaging: Chief Complaint:  Patient is a 58y old  Female who presents with a chief complaint of dizziness (02 Aug 2019 07:15)      HPI:  58F w/ heavy ETOH use, daily smoker, HTN, and anxiety who presents to the ED w/ dizziness and nausea x 1 week found ot have melena and hematemesisi n the ED now with suspicious pseudoaneurysm on CT. GI consulted for evaluation.    Pt states she has been feeling dizzy for the past week. She developed nausea over the past couple of days and vomiting once at night; the vomit was green without any streaks of blood. Has also noticed small black liquidy stools for the past 3days but never prior to this. Has also been having left sided CP w/ some palpitations over the past few days. Of note, pt has been drinking 5-6cans of beers daily (last drink 8/1 at 4pm) and has been smoking 10cigs daily for >40yrs.     ED course:  /71 (became hypotensive to SBPs 80s) , tachy to 120s  RR 18  97%RA  Found to have Hgb 5.9. 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 IR consulted, recommended evaluation by GI and possible EGD, prior to the more invasive options such as angiography/embolization.  While in the ED, patient had an episode of hematemesis and melena. Started on octreotide and PPI gtt. s/p 5 u PRBC with appropriate response. Repeat Hb of 10.2. Currently patient is stable.    Allergies:  No Known Allergies      Home Medications:    Hospital Medications:  chlorhexidine 4% Liquid 1 Application(s) Topical <User Schedule>  folic acid 1 milliGRAM(s) Oral daily  LORazepam   Injectable 2 milliGRAM(s) IV Push every 2 hours PRN  LORazepam   Injectable 2 milliGRAM(s) IV Push every 1 hour PRN  metoclopramide Injectable 10 milliGRAM(s) IV Push once  multivitamin 1 Tablet(s) Oral daily  nicotine - 21 mG/24Hr(s) Patch 1 patch Transdermal daily  pantoprazole Infusion 8 mG/Hr IV Continuous <Continuous>  thiamine Injectable 100 milliGRAM(s) IV Push daily      PMHX/PSHX:  Hypertension  ETOH abuse  Anxiety  H/O hernia repair      Family history:  FH: hypertension      Social History: + smoking, + EtOH abuse, denies illicit drugs    ROS:     General:  No wt loss, fevers, chills, night sweats, fatigue,   Eyes:  Good vision, no reported pain  ENT:  No sore throat, pain, runny nose, dysphagia  CV:  No pain, palpitations, hypo/hypertension  Resp:  No dyspnea, cough, tachypnea, wheezing  GI:  See HPI  :  No pain, bleeding, incontinence, nocturia  Muscle:  No pain, weakness  Neuro:  No weakness, tingling, memory problems  Psych:  No fatigue, insomnia, mood problems, depression  Endocrine:  No polyuria, polydipsia, cold/heat intolerance  Heme:  No petechiae, ecchymosis, easy bruisability  Skin:  No rash, edema      PHYSICAL EXAM:     GENERAL:  NAD, sleeping comfortably in bed  HEENT:  sclera anicteric  NECK: supple  CHEST:  Full & symmetric excursion, no increased effort  HEART:  Regular rhythm, S1, S2  ABDOMEN:  Soft, non-tender, non-distended, normoactive bowel sounds,  no masses  EXTREMITIES:  no cyanosis, clubbing or edema  SKIN:  No rash/erythema/ecchymoses/petechiae/wounds/abscess/warm/dry  NEURO:  Alert, oriented  PSYCH: Normal affect    Vital Signs:  Vital Signs Last 24 Hrs  T(C): 36.5 (02 Aug 2019 09:30), Max: 37.3 (02 Aug 2019 05:19)  T(F): 97.7 (02 Aug 2019 09:30), Max: 99.1 (02 Aug 2019 05:19)  HR: 96 (02 Aug 2019 10:00) (74 - 135)  BP: 126/94 (02 Aug 2019 10:00) (83/61 - 136/92)  BP(mean): 105 (02 Aug 2019 10:00) (105 - 106)  RR: 20 (02 Aug 2019 10:00) (12 - 20)  SpO2: 100% (02 Aug 2019 10:00) (97% - 100%)  Daily Height in cm: 154.94 (02 Aug 2019 09:30)    Daily     LABS:                        10.2   8.89  )-----------( 195      ( 02 Aug 2019 07:08 )             29.9     08-02    138  |  109<H>  |  24<H>  ----------------------------<  159<H>  4.4   |  19<L>  |  0.71    Ca    7.4<L>      02 Aug 2019 08:01  Phos  2.9     08-02  Mg     1.9     08-02    TPro  4.5<L>  /  Alb  2.7<L>  /  TBili  0.9  /  DBili  x   /  AST  23  /  ALT  12  /  AlkPhos  46  08-02    LIVER FUNCTIONS - ( 02 Aug 2019 08:01 )  Alb: 2.7 g/dL / Pro: 4.5 g/dL / ALK PHOS: 46 u/L / ALT: 12 u/L / AST: 23 u/L / GGT: x           PT/INR - ( 01 Aug 2019 21:35 )   PT: 11.2 SEC;   INR: 0.98          PTT - ( 01 Aug 2019 21:35 )  PTT:21.6 SEC    Amylase Serum--      Lipase serum43.4       Ammonia--      Imaging:  CT: CT Angio: suggestive of pseudoaneurysm in the stomach with active bleeding

## 2019-08-02 NOTE — H&P ADULT - PROBLEM SELECTOR PLAN 3
p/w normocytic anemia w/ Hgb 5.9 likely 2/2 acute GIB  -now s/p 5u pRBC given active ongoing bleed w/ initial minimal response  -maintain active T+S, goal Hgb >7.0  -GI following  -f/u iron studies, ferritin, retic count  -f/u b12, folate in AM

## 2019-08-02 NOTE — CONSULT NOTE ADULT - ATTENDING COMMENTS
57 yo woman with HTN, daily EtOH user (6-7 beers/d) presenting with dizziness and n/v and found to have Hgb 5.9, hematemasis x2, melena x1 in ED  Pt seen and examined in ED after 2U PRBC; daughter is   Awake alert oriented in NAD  107/61   exam benign: lungs clear, abd nontender, no edema  EKG without ischemic changes  platelets, coags, lft's ok    UGI bleed in pt with long h/o EtOH abuse; likely gastritis vs ulcer vs emerson cuellar tear; hemodynamically  stable    - monitor CBC, transfuse to hgb >7  - GI eval  - pantoprazole  - thiamine folate mvi  - hold home anti htn meds (beta blocker and ACEi)  - pt does not need MICU admission at this time
I have seen and evaluated the patient with the GI Fellow and GI Team.  I agree with the findings, formulation and plan of care as documented in the resident / fellows note and edited where appropriate.

## 2019-08-02 NOTE — H&P ADULT - NSHPREVIEWOFSYSTEMS_GEN_ALL_CORE

## 2019-08-02 NOTE — ED ADULT NURSE REASSESSMENT NOTE - NS ED NURSE REASSESS COMMENT FT1
break coverage RN- pt awake, a/ox3, vitally stable, daughter at bedside in NAD at this time, MICU at bedside for consult, no active bleeding at this time, will continue to monitor

## 2019-08-02 NOTE — CONSULT NOTE ADULT - ASSESSMENT
58 y.o female with a PMHx of HTN, current smoker and chronic drinker (6 beers/day for many years) presents to the ED for dizziness and vomiting, found to have hematemesis and melena in ED with Hgb of 5.9 2/2 GIB. MICU consulted for GIB.     #GIB  -VS stable, with persistent MAP > 65, patient with sinus tachycardia as expected  -continue fluid hydration, octreotide gtt, and PPI gtt   -pRBC PRN  -f/u GI recommendations   -last drink was night of 7/31 so monitor for alcohol withdrawal     Currently, patient is hemodynamically stable with adequate response to interventions. Not a MICU candidate at this time.     Discussed with Dr. Yara Rios, PGY2

## 2019-08-02 NOTE — H&P ADULT - NSHPLABSRESULTS_GEN_ALL_CORE
6.1    14.04 )-----------( 165      ( 02 Aug 2019 02:50 )             18.4       08-01    133<L>  |  98  |  42<H>  ----------------------------<  139<H>  4.3   |  22  |  0.90    Ca    9.1      01 Aug 2019 21:25    TPro  5.9<L>  /  Alb  3.5  /  TBili  < 0.2<L>  /  DBili  x   /  AST  25  /  ALT  13  /  AlkPhos  59  08-01                  PT/INR - ( 01 Aug 2019 21:35 )   PT: 11.2 SEC;   INR: 0.98          PTT - ( 01 Aug 2019 21:35 )  PTT:21.6 SEC    Lactate Trend            CAPILLARY BLOOD GLUCOSE      POCT Blood Glucose.: 130 mg/dL (01 Aug 2019 21:48)        Cultures: none    Radiology:    EKG: 6.1    14.04 )-----------( 165      ( 02 Aug 2019 02:50 )             18.4       08-01    133<L>  |  98  |  42<H>  ----------------------------<  139<H>  4.3   |  22  |  0.90    Ca    9.1      01 Aug 2019 21:25    TPro  5.9<L>  /  Alb  3.5  /  TBili  < 0.2<L>  /  DBili  x   /  AST  25  /  ALT  13  /  AlkPhos  59  08-01                  PT/INR - ( 01 Aug 2019 21:35 )   PT: 11.2 SEC;   INR: 0.98          PTT - ( 01 Aug 2019 21:35 )  PTT:21.6 SEC    Lactate Trend            CAPILLARY BLOOD GLUCOSE      POCT Blood Glucose.: 130 mg/dL (01 Aug 2019 21:48)        Cultures: none    Radiology:  < from: CT Angio Abdomen and Pelvis w/ IV Cont (08.02.19 @ 01:42) >    BLADDER: Within normal limits.  REPRODUCTIVE ORGANS: Uterus and adnexa within normal limits.    BOWEL: Focal contrast blush in the gastric body along the greater   curvature which follows blood pool on arterial and venous phases   suspicious for pseudoaneurysm. Contrast pooling along the gastric wall on   venous phase indicative of active bleeding. No bowel obstruction.  PERITONEUM: Noascites. No pneumoperitoneum. Calcified mesenteric nodule   in the right midabdomen may be from a focus of fat necrosis.  VESSELS: Within normal limits.  RETROPERITONEUM/LYMPH NODES: Few nonspecific subcentimeter gastrohepatic   and greater omental lymph nodes.    ABDOMINAL WALL: Within normal limits.  BONES: Degenerative changes of the spine.    IMPRESSION:     Pseudoaneurysm arising from the anterior gastric body with active   bleeding.    These findings were discussed with Dr. Blue at 8/2/2019 2:44 AM by   Dr. Clover Jeffers of Radiology with read back confirmation.    < end of copied text >    < from: Xray Chest 1 View AP/PA (08.02.19 @ 01:49) >    IMPRESSION:    Clear lungs.      < end of copied text >        EKG: NSR, Qtc wnl

## 2019-08-02 NOTE — CONSULT NOTE ADULT - ASSESSMENT
58F w/ heavy ETOH use, daily smoker, HTN, and anxiety who presents to the ED w/ dizziness and nausea x 1 week found ot have melena and hematemesisi n the ED now with suspicious pseudoaneurysm on CT. GI consulted for evaluation.      1) Hematemesis/Melena  Patient presenting with hematemesis and vomiting, hypotension, Hb of 5.9 on admission. Now s/p 5u PRBC with Hb of 10.2. CT angio showing polypoid extravasation of contrast in the stomach with containment of contrast on delayed phase suggestive of possible pseudoaneurysm vs. bleeding polyp.    - please keep NPO for now  - serial CBC and transfuse of Hb<7  - PPI gtt  - plan for EGD today  - IR consulted, follow up recs  - please intubate patient ASAP for scope today  - please give reglan 10mg IV x1 now to help with propulsion of blood clots from terrence stomach 58F w/ heavy ETOH use, daily smoker, HTN, and anxiety who presents to the ED w/ dizziness and nausea x 1 week found ot have melena and hematemesisi n the ED now with suspicious pseudoaneurysm on CT. GI consulted for evaluation.      1) Hematemesis/Melena  Patient presenting with hematemesis and vomiting, hypotension, Hb of 5.9 on admission. Now s/p 5u PRBC with Hb of 10.2. CT angio showing polypoid extravasation of contrast in the stomach with containment of contrast on delayed phase suggestive of possible pseudoaneurysm vs. bleeding polyp.  2) EtOH abuse.    - please keep NPO for now  - serial CBC and transfuse to Hb>7  - PPI gtt  - plan for EGD today  - imaging reviewed with radiology and IR  - please intubate patient ASAP for scope today  - please give Reglan 10mg IV x1 now to help with propulsion of blood clots from the stomach

## 2019-08-02 NOTE — PHYSICAL THERAPY INITIAL EVALUATION ADULT - PATIENT PROFILE REVIEW, REHAB EVAL
yes/PT orders received: no formal activity order. Consult with MICHELA HENRY, pt may participate in PT evaluation, however only perform ROM secondary to low H&H.

## 2019-08-02 NOTE — H&P ADULT - ASSESSMENT
58F w/ heavy ETOH use, daily smoker, HTN, and anxiety who presents to the ED w/ dizziness and nausea x 1 week, found to have Hgb 5.9 w/ hematemesis and hematochezia w/ CTA showing active bleeding from pseudoaneurysm arising from anterior gastric body, admitted for mgmt of acute anemia 2/2 UGIB.

## 2019-08-02 NOTE — H&P ADULT - NSHPSOCIALHISTORY_GEN_ALL_CORE
lives w/ daughter and her family in [place]  travels to [place], where her  and son live every few mths; pharmacy and doctor are there too  has been drinking 5-6beers/day since "forever"  has been smoking 10 cigs/day since "forever"  smokes marijuana occasionally  denies IVDU or other rec drug use

## 2019-08-03 LAB
ALBUMIN SERPL ELPH-MCNC: 2.4 G/DL — LOW (ref 3.3–5)
ALP SERPL-CCNC: 43 U/L — SIGNIFICANT CHANGE UP (ref 40–120)
ALT FLD-CCNC: 10 U/L — SIGNIFICANT CHANGE UP (ref 4–33)
ANION GAP SERPL CALC-SCNC: 6 MMO/L — LOW (ref 7–14)
AST SERPL-CCNC: 21 U/L — SIGNIFICANT CHANGE UP (ref 4–32)
BILIRUB SERPL-MCNC: 0.4 MG/DL — SIGNIFICANT CHANGE UP (ref 0.2–1.2)
BUN SERPL-MCNC: 12 MG/DL — SIGNIFICANT CHANGE UP (ref 7–23)
CALCIUM SERPL-MCNC: 7.9 MG/DL — LOW (ref 8.4–10.5)
CHLORIDE SERPL-SCNC: 115 MMOL/L — HIGH (ref 98–107)
CO2 SERPL-SCNC: 20 MMOL/L — LOW (ref 22–31)
CREAT SERPL-MCNC: 0.59 MG/DL — SIGNIFICANT CHANGE UP (ref 0.5–1.3)
GLUCOSE SERPL-MCNC: 87 MG/DL — SIGNIFICANT CHANGE UP (ref 70–99)
HCT VFR BLD CALC: 26.6 % — LOW (ref 34.5–45)
HCT VFR BLD CALC: 27.4 % — LOW (ref 34.5–45)
HCT VFR BLD CALC: 28.3 % — LOW (ref 34.5–45)
HGB BLD-MCNC: 8.9 G/DL — LOW (ref 11.5–15.5)
HGB BLD-MCNC: 9.2 G/DL — LOW (ref 11.5–15.5)
HGB BLD-MCNC: 9.5 G/DL — LOW (ref 11.5–15.5)
MAGNESIUM SERPL-MCNC: 2 MG/DL — SIGNIFICANT CHANGE UP (ref 1.6–2.6)
MCHC RBC-ENTMCNC: 29.1 PG — SIGNIFICANT CHANGE UP (ref 27–34)
MCHC RBC-ENTMCNC: 29.3 PG — SIGNIFICANT CHANGE UP (ref 27–34)
MCHC RBC-ENTMCNC: 30 PG — SIGNIFICANT CHANGE UP (ref 27–34)
MCHC RBC-ENTMCNC: 33.5 % — SIGNIFICANT CHANGE UP (ref 32–36)
MCHC RBC-ENTMCNC: 33.6 % — SIGNIFICANT CHANGE UP (ref 32–36)
MCHC RBC-ENTMCNC: 33.6 % — SIGNIFICANT CHANGE UP (ref 32–36)
MCV RBC AUTO: 86.9 FL — SIGNIFICANT CHANGE UP (ref 80–100)
MCV RBC AUTO: 87.3 FL — SIGNIFICANT CHANGE UP (ref 80–100)
MCV RBC AUTO: 89.3 FL — SIGNIFICANT CHANGE UP (ref 80–100)
NRBC # FLD: 0 K/UL — SIGNIFICANT CHANGE UP (ref 0–0)
PHOSPHATE SERPL-MCNC: 3.2 MG/DL — SIGNIFICANT CHANGE UP (ref 2.5–4.5)
PLATELET # BLD AUTO: 157 K/UL — SIGNIFICANT CHANGE UP (ref 150–400)
PLATELET # BLD AUTO: 158 K/UL — SIGNIFICANT CHANGE UP (ref 150–400)
PLATELET # BLD AUTO: 162 K/UL — SIGNIFICANT CHANGE UP (ref 150–400)
PMV BLD: 9.3 FL — SIGNIFICANT CHANGE UP (ref 7–13)
PMV BLD: 9.3 FL — SIGNIFICANT CHANGE UP (ref 7–13)
PMV BLD: 9.5 FL — SIGNIFICANT CHANGE UP (ref 7–13)
POTASSIUM SERPL-MCNC: 3.6 MMOL/L — SIGNIFICANT CHANGE UP (ref 3.5–5.3)
POTASSIUM SERPL-SCNC: 3.6 MMOL/L — SIGNIFICANT CHANGE UP (ref 3.5–5.3)
PROT SERPL-MCNC: 4.3 G/DL — LOW (ref 6–8.3)
RBC # BLD: 3.06 M/UL — LOW (ref 3.8–5.2)
RBC # BLD: 3.14 M/UL — LOW (ref 3.8–5.2)
RBC # BLD: 3.17 M/UL — LOW (ref 3.8–5.2)
RBC # FLD: 18.5 % — HIGH (ref 10.3–14.5)
RBC # FLD: 19.1 % — HIGH (ref 10.3–14.5)
RBC # FLD: 19.6 % — HIGH (ref 10.3–14.5)
SODIUM SERPL-SCNC: 141 MMOL/L — SIGNIFICANT CHANGE UP (ref 135–145)
WBC # BLD: 6.83 K/UL — SIGNIFICANT CHANGE UP (ref 3.8–10.5)
WBC # BLD: 8.26 K/UL — SIGNIFICANT CHANGE UP (ref 3.8–10.5)
WBC # BLD: 8.79 K/UL — SIGNIFICANT CHANGE UP (ref 3.8–10.5)
WBC # FLD AUTO: 6.83 K/UL — SIGNIFICANT CHANGE UP (ref 3.8–10.5)
WBC # FLD AUTO: 8.26 K/UL — SIGNIFICANT CHANGE UP (ref 3.8–10.5)
WBC # FLD AUTO: 8.79 K/UL — SIGNIFICANT CHANGE UP (ref 3.8–10.5)

## 2019-08-03 PROCEDURE — 99291 CRITICAL CARE FIRST HOUR: CPT

## 2019-08-03 PROCEDURE — 71045 X-RAY EXAM CHEST 1 VIEW: CPT | Mod: 26

## 2019-08-03 PROCEDURE — 99232 SBSQ HOSP IP/OBS MODERATE 35: CPT | Mod: GC

## 2019-08-03 RX ORDER — SODIUM CHLORIDE 9 MG/ML
1000 INJECTION INTRAMUSCULAR; INTRAVENOUS; SUBCUTANEOUS
Refills: 0 | Status: DISCONTINUED | OUTPATIENT
Start: 2019-08-03 | End: 2019-08-06

## 2019-08-03 RX ORDER — SODIUM CHLORIDE 9 MG/ML
1000 INJECTION INTRAMUSCULAR; INTRAVENOUS; SUBCUTANEOUS
Refills: 0 | Status: DISCONTINUED | OUTPATIENT
Start: 2019-08-03 | End: 2019-08-03

## 2019-08-03 RX ADMIN — Medication 1 PATCH: at 06:47

## 2019-08-03 RX ADMIN — SODIUM CHLORIDE 75 MILLILITER(S): 9 INJECTION INTRAMUSCULAR; INTRAVENOUS; SUBCUTANEOUS at 06:50

## 2019-08-03 RX ADMIN — Medication 1 PATCH: at 19:14

## 2019-08-03 RX ADMIN — SODIUM CHLORIDE 75 MILLILITER(S): 9 INJECTION INTRAMUSCULAR; INTRAVENOUS; SUBCUTANEOUS at 23:22

## 2019-08-03 RX ADMIN — CHLORHEXIDINE GLUCONATE 15 MILLILITER(S): 213 SOLUTION TOPICAL at 06:49

## 2019-08-03 RX ADMIN — Medication 7.43 MICROGRAM(S)/KG/MIN: at 06:49

## 2019-08-03 RX ADMIN — CHLORHEXIDINE GLUCONATE 1 APPLICATION(S): 213 SOLUTION TOPICAL at 16:00

## 2019-08-03 RX ADMIN — Medication 1 PATCH: at 12:45

## 2019-08-03 RX ADMIN — Medication 100 MILLIGRAM(S): at 12:44

## 2019-08-03 RX ADMIN — Medication 1 PATCH: at 13:09

## 2019-08-03 NOTE — CHART NOTE - NSCHARTNOTEFT_GEN_A_CORE
MICU Transfer Note    Transfer from: MICU  Transfer to:  (  ) Medicine    (  ) Telemetry    (  ) RCU    (  ) Palliative    (  ) Stroke Unit    (  ) _______________  Accepting physican:      MICU COURSE:  58 y.o [place] speaking female with a PMHx of HTN, current smoker and chronic drinker (6 beers/day for many years) presents to the ED for dizziness and vomiting, found to have hematemesis and melena in ED with Hgb of 5.9 2/2 GIB. CTA with active bleeding in stomach and pseudoaneurysm. GI consulted - patient on PPI drip. Patient was intubated for airway protection for EGD. S/p EGD on 8/2 with gastric ulcer with adherent clot - treated with epi and APC. Clot unable to be completely removed. S/p 5U PRBCs, 1U plasma, 1U platelets. H/H now stable. Patient was extubated today, tolerated well. Trending CBCs q8 now. On PPI gtt for 72 hours post-EGD.         ASSESSMENT & PLAN:   58 y.o [place] speaking female with a PMHx of HTN, current smoker and chronic drinker (6 beers/day for many years) presents to the ED for dizziness and vomiting, found to have hematemesis and melena in ED with Hgb of 5.9 2/2 GIB. MICU consulted for GIB.     #Neuro- no active issues    #CV  -normally hypertensive (on Atenolol and Lisinopril at home)  -hold anti-hypertensives, BP slightly soft    #Pulm - now extubated, no active issues    #GI  -Hgb down to 5.9 with hematemesis and melena 2/2 to GIB  -CTA demonstrating active bleeding from pseudoaneurysm of gastric antrum  -IR consulted- recommended GI endoscopic evaluation prior to invasive procedure such as embolization   -patient admitted to MICU for elective intubation for endoscopy by GI   -Surgical evaluation, no interventions   -s/p 5 units pRBC, 1U plasma, 1U platelets  - s/p EGD on 8/2 with gastric ulcer with adherent clot, treated with apc and epi, unable to fully remove clot  -continue protonix gtt x72 hrs (until 8/5), H/H stable  -GI recs appreciated  -patient is an active drinker, monitor for alcohol withdrawal     #ID  -leukocytosis and tachycardia likely 2/2 GIB  -unlikely infection, no localizing sx or fever   -will pan culture if febrile     #  -no active issues    #Endo  -no active issues    #Heme  -anemia 2/2 to GIB   -continue to transfuse PRN  - h/h stable - transitioned to q8    #DVT ppx  -hold in setting of active bleed.      For Follow-Up:  [ ] monitor H/H  [ ] f/u further GI recs  [ ] monitor for alcohol w/d - start on CIWA when transferred to floor        Vital Signs Last 24 Hrs  T(C): 36.6 (03 Aug 2019 16:00), Max: 36.7 (02 Aug 2019 20:00)  T(F): 97.9 (03 Aug 2019 16:00), Max: 98 (02 Aug 2019 20:00)  HR: 111 (03 Aug 2019 16:00) (56 - 111)  BP: 122/88 (03 Aug 2019 16:00) (76/52 - 123/85)  BP(mean): 99 (03 Aug 2019 16:00) (60 - 99)  RR: 15 (03 Aug 2019 16:00) (13 - 16)  SpO2: 100% (03 Aug 2019 16:00) (99% - 100%)  I&O's Summary    02 Aug 2019 07:01  -  03 Aug 2019 07:00  --------------------------------------------------------  IN: 921 mL / OUT: 1450 mL / NET: -529 mL    03 Aug 2019 07:01  -  03 Aug 2019 17:22  --------------------------------------------------------  IN: 821.6 mL / OUT: 1050 mL / NET: -228.4 mL          MEDICATIONS  (STANDING):  chlorhexidine 4% Liquid 1 Application(s) Topical <User Schedule>  folic acid 1 milliGRAM(s) Oral daily  multivitamin 1 Tablet(s) Oral daily  nicotine - 21 mG/24Hr(s) Patch 1 patch Transdermal daily  pantoprazole Infusion 8 mG/Hr (10 mL/Hr) IV Continuous <Continuous>  sodium chloride 0.9%. 1000 milliLiter(s) (75 mL/Hr) IV Continuous <Continuous>  thiamine Injectable 100 milliGRAM(s) IV Push daily    MEDICATIONS  (PRN):        LABS                                            9.5                   Neurophils% (auto):   x      (08-03 @ 13:00):    8.79 )-----------(162          Lymphocytes% (auto):  x                                             28.3                   Eosinphils% (auto):   x        Manual%: Neutrophils x    ; Lymphocytes x    ; Eosinophils x    ; Bands%: x    ; Blasts x                                    141    |  115    |  12                  Calcium: 7.9   / iCa: x      (08-03 @ 02:30)    ----------------------------<  87        Magnesium: 2.0                              3.6     |  20     |  0.59             Phosphorous: 3.2      TPro  4.3    /  Alb  2.4    /  TBili  0.4    /  DBili  x      /  AST  21     /  ALT  10     /  AlkPhos  43     03 Aug 2019 02:30

## 2019-08-03 NOTE — PROGRESS NOTE ADULT - ASSESSMENT
This is a 59 y/o woman with ETOH use disorder, daily smoker, HTN, and anxiety who presents to the ED w/ dizziness and nausea x 1 week and upon walking to the restroom, she had a large bloody bowel movement, and subsequently had a large volume hematemesis with resultant hypotension.  Pt given 4u PRBCs, octreotide and protonix gtt.  GI requesting surgery back-up in case unable to control pseudoaneurysm endoscopically.    -No acute surgical intervention warranted  -Care as per GI   -D/w Dr. Galloway    Will sign off, please reconsult as needed    [facility] [id number]

## 2019-08-03 NOTE — PROGRESS NOTE ADULT - ASSESSMENT
58 y.o Kuwaiti speaking female with a PMHx of HTN, current smoker and chronic drinker (6 beers/day for many years) presents to the ED for dizziness and vomiting, found to have hematemesis and melena in ED with Hgb of 5.9 2/2 GIB. MICU consulted for GIB.     #Neuro      #CV  -normally hypertensive (on Atenolol and Lisinopril at home)  -hold anti-hypertensives, BP slightly soft    #Pulm      #GI  -Hgb down to 5.9 with hematemesis and melena 2/2 to GIB  -CTA demonstrating active bleeding from pseudoaneurysm of gastric antrum  -IR consulted- recommended GI endoscopic evaluation prior to invasive procedure such as embolization   -patient admitted to MICU for elective intubation for endoscopy by GI   -Surgical evaluation, no interventions   -s/p 5 units pRBC, transfuse PRN   -continue protonix gtt   -GI recs appreciated  -patient is an active drinker, monitor for alcohol withdrawal     #ID  -leukocytosis and tachycardia likely 2/2 GIB  -unlikely infection, no localizing sx or fever   -will pan culture if febrile     #  -no active issues    #Endo  -no active issues    #Heme  -anemia 2/2 to GIB   -continue to transfuse PRN    #DVT ppx  -hold in setting of active bleed. 58 y.o [place] speaking female with a PMHx of HTN, current smoker and chronic drinker (6 beers/day for many years) presents to the ED for dizziness and vomiting, found to have hematemesis and melena in ED with Hgb of 5.9 2/2 GIB. MICU consulted for GIB.     #Neuro      #CV  -normally hypertensive (on Atenolol and Lisinopril at home)  -hold anti-hypertensives, BP slightly soft    #Pulm      #GI  -Hgb down to 5.9 with hematemesis and melena 2/2 to GIB  -CTA demonstrating active bleeding from pseudoaneurysm of gastric antrum  -IR consulted- recommended GI endoscopic evaluation prior to invasive procedure such as embolization   -patient admitted to MICU for elective intubation for endoscopy by GI   -Surgical evaluation, no interventions   -s/p 5 units pRBC, 1U plasma, 1U platelets  - s/p EGD on 8/2 with gastric ulcer with adherent clot, treated with apc and epi, unable to fully remove clot  -continue protonix gtt x72 hrs (until 8/5), H/H stable  -GI recs appreciated  -patient is an active drinker, monitor for alcohol withdrawal     #ID  -leukocytosis and tachycardia likely 2/2 GIB  -unlikely infection, no localizing sx or fever   -will pan culture if febrile     #  -no active issues    #Endo  -no active issues    #Heme  -anemia 2/2 to GIB   -continue to transfuse PRN  - h/h q6 for now, if remains stable today can change to BID    #DVT ppx  -hold in setting of active bleed. 58 y.o Romanian speaking female with a PMHx of HTN, current smoker and chronic drinker (6 beers/day for many years) presents to the ED for dizziness and vomiting, found to have hematemesis and melena in ED with Hgb of 5.9 2/2 GIB. MICU consulted for GIB.     #Neuro      #CV  -normally hypertensive (on Atenolol and Lisinopril at home)  -hold anti-hypertensives, BP slightly soft    #Pulm      #GI  -Hgb down to 5.9 with hematemesis and melena 2/2 to GIB  -CTA demonstrating active bleeding from pseudoaneurysm of gastric antrum  -IR consulted- recommended GI endoscopic evaluation prior to invasive procedure such as embolization   -patient admitted to MICU for elective intubation for endoscopy by GI   -Surgical evaluation, no interventions   -s/p 5 units pRBC, 1U plasma, 1U platelets  - s/p EGD on 8/2 with gastric ulcer with adherent clot, treated with apc and epi, unable to fully remove clot  -continue protonix gtt x72 hrs (until 8/5), H/H stable  -GI recs appreciated  -patient is an active drinker, monitor for alcohol withdrawal     #ID  -leukocytosis and tachycardia likely 2/2 GIB  -unlikely infection, no localizing sx or fever   -will pan culture if febrile     #  -no active issues    #Endo  -no active issues    #Heme  -anemia 2/2 to GIB   -continue to transfuse PRN  - h/h stable - transitioned to q8    #DVT ppx  -hold in setting of active bleed. 58 y.o Vietnamese speaking female with a PMHx of HTN, current smoker and chronic drinker (6 beers/day for many years) presents to the ED for dizziness and vomiting, found to have hematemesis and melena in ED with Hgb of 5.9 2/2 GIB. MICU consulted for GIB.     #Neuro - intubated and sedated      #CV  -normally hypertensive (on Atenolol and Lisinopril at home)  -hold anti-hypertensives, BP slightly soft    #Pulm - intubated  - sedation turned off on rounds, plan for extubation today      #GI  -Hgb down to 5.9 with hematemesis and melena 2/2 to GIB  -CTA demonstrating active bleeding from pseudoaneurysm of gastric antrum  -IR consulted- recommended GI endoscopic evaluation prior to invasive procedure such as embolization   -patient admitted to MICU for elective intubation for endoscopy by GI   -Surgical evaluation, no interventions   -s/p 5 units pRBC, 1U plasma, 1U platelets  - s/p EGD on 8/2 with gastric ulcer with adherent clot, treated with apc and epi, unable to fully remove clot  -continue protonix gtt x72 hrs (until 8/5), H/H stable  -GI recs appreciated  -patient is an active drinker, monitor for alcohol withdrawal     #ID  -leukocytosis and tachycardia likely 2/2 GIB  -unlikely infection, no localizing sx or fever   -will pan culture if febrile     #  -no active issues    #Endo  -no active issues    #Heme  -anemia 2/2 to GIB   -continue to transfuse PRN  - h/h stable - transitioned to q8    #DVT ppx  -hold in setting of active bleed.

## 2019-08-03 NOTE — PROGRESS NOTE ADULT - SUBJECTIVE AND OBJECTIVE BOX
INTERVAL HPI/OVERNIGHT EVENTS:    SUBJECTIVE: Patient seen and examined at bedside.     CONSTITUTIONAL: No weakness, fevers or chills  EYES/ENT: No visual changes;  No vertigo or throat pain   NECK: No pain or stiffness  RESPIRATORY: No cough, wheezing, hemoptysis; No shortness of breath  CARDIOVASCULAR: No chest pain or palpitations  GASTROINTESTINAL: No abdominal or epigastric pain. No nausea, vomiting, or hematemesis; No diarrhea or constipation. No melena or hematochezia.  GENITOURINARY: No dysuria, frequency or hematuria  NEUROLOGICAL: No numbness or weakness  SKIN: No itching, rashes    OBJECTIVE:    VITAL SIGNS:  ICU Vital Signs Last 24 Hrs  T(C): 36.1 (03 Aug 2019 04:00), Max: 37.1 (02 Aug 2019 16:00)  T(F): 97 (03 Aug 2019 04:00), Max: 98.7 (02 Aug 2019 16:00)  HR: 67 (03 Aug 2019 06:30) (56 - 147)  BP: 101/67 (03 Aug 2019 06:30) (84/58 - 184/104)  BP(mean): 79 (03 Aug 2019 06:30) (68 - 127)  ABP: --  ABP(mean): --  RR: 14 (03 Aug 2019 06:30) (11 - 22)  SpO2: 100% (03 Aug 2019 06:30) (89% - 100%)    Mode: AC/ CMV (Assist Control/ Continuous Mandatory Ventilation), RR (machine): 14, TV (machine): 350, FiO2: 40, PEEP: 5, MAP: 7, PIP: 18    08-02 @ 07:01  -  08-03 @ 07:00  --------------------------------------------------------  IN: 921 mL / OUT: 1450 mL / NET: -529 mL      CAPILLARY BLOOD GLUCOSE      POCT Blood Glucose.: 130 mg/dL (01 Aug 2019 21:48)      PHYSICAL EXAM:    General: NAD  HEENT: NC/AT; PERRL, clear conjunctiva  Neck: supple  Respiratory: CTA b/l  Cardiovascular: +S1/S2; RRR  Abdomen: soft, NT/ND; +BS x4  Extremities: WWP, 2+ peripheral pulses b/l; no LE edema  Skin: normal color and turgor; no rash  Neurological:    MEDICATIONS:  MEDICATIONS  (STANDING):  chlorhexidine 0.12% Liquid 15 milliLiter(s) Oral Mucosa every 12 hours  chlorhexidine 4% Liquid 1 Application(s) Topical <User Schedule>  fentaNYL   Infusion. 1 MICROgram(s)/kG/Hr (7.93 mL/Hr) IV Continuous <Continuous>  folic acid 1 milliGRAM(s) Oral daily  midazolam Infusion 0.038 mG/kG/Hr (3 mL/Hr) IV Continuous <Continuous>  multivitamin 1 Tablet(s) Oral daily  nicotine - 21 mG/24Hr(s) Patch 1 patch Transdermal daily  norepinephrine Infusion 0.05 MICROgram(s)/kG/Min (7.434 mL/Hr) IV Continuous <Continuous>  pantoprazole Infusion 8 mG/Hr (10 mL/Hr) IV Continuous <Continuous>  propofol Infusion 50 MICROgram(s)/kG/Min (23.79 mL/Hr) IV Continuous <Continuous>  sodium chloride 0.9%. 1000 milliLiter(s) (75 mL/Hr) IV Continuous <Continuous>  thiamine Injectable 100 milliGRAM(s) IV Push daily    MEDICATIONS  (PRN):      ALLERGIES:  Allergies    No Known Allergies    Intolerances        LABS:                        8.9    6.83  )-----------( 158      ( 03 Aug 2019 02:30 )             26.6     08-03    141  |  115<H>  |  12  ----------------------------<  87  3.6   |  20<L>  |  0.59    Ca    7.9<L>      03 Aug 2019 02:30  Phos  3.2     08-03  Mg     2.0     08-03    TPro  4.3<L>  /  Alb  2.4<L>  /  TBili  0.4  /  DBili  x   /  AST  21  /  ALT  10  /  AlkPhos  43  08-03    PT/INR - ( 01 Aug 2019 21:35 )   PT: 11.2 SEC;   INR: 0.98          PTT - ( 01 Aug 2019 21:35 )  PTT:21.6 SEC      RADIOLOGY & ADDITIONAL TESTS: Reviewed. INTERVAL HPI/OVERNIGHT EVENTS: no acute events overnight. Patient remains intubated and sedated. Has maroon colored stool in rectal pouch, H/H stable.     SUBJECTIVE: Patient seen and examined at bedside.     ROS: unable to obtain due to sedation    OBJECTIVE:    VITAL SIGNS:  ICU Vital Signs Last 24 Hrs  T(C): 36.1 (03 Aug 2019 08:00), Max: 37.1 (02 Aug 2019 16:00)  T(F): 97 (03 Aug 2019 08:00), Max: 98.7 (02 Aug 2019 16:00)  HR: 59 (03 Aug 2019 10:00) (56 - 147)  BP: 93/66 (03 Aug 2019 10:00) (76/52 - 184/104)  BP(mean): 76 (03 Aug 2019 10:00) (60 - 127)  ABP: --  ABP(mean): --  RR: 14 (03 Aug 2019 10:00) (11 - 22)  SpO2: 100% (03 Aug 2019 10:00) (89% - 100%)    Mode: AC/ CMV (Assist Control/ Continuous Mandatory Ventilation), RR (machine): 14, TV (machine): 350, FiO2: 40, PEEP: 5, MAP: 7, PIP: 17    08-02 @ 07:01 - 08-03 @ 07:00  --------------------------------------------------------  IN: 921 mL / OUT: 1450 mL / NET: -529 mL    08-03 @ 07:01 - 08-03 @ 11:42  --------------------------------------------------------  IN: 340.8 mL / OUT: 0 mL / NET: 340.8 mL      CAPILLARY BLOOD GLUCOSE      POCT Blood Glucose.: 130 mg/dL (01 Aug 2019 21:48)      PHYSICAL EXAM:    General: intubated  HEENT: NC/AT; PERRL, clear conjunctiva  Neck: supple  Respiratory: CTA b/l  Cardiovascular: +S1/S2; RRR  Abdomen: soft, NT/ND; +BS x4  Extremities: WWP, 2+ peripheral pulses b/l; no LE edema  Skin: normal color and turgor; no rash  Neurological:    MEDICATIONS:  MEDICATIONS  (STANDING):  chlorhexidine 0.12% Liquid 15 milliLiter(s) Oral Mucosa every 12 hours  chlorhexidine 4% Liquid 1 Application(s) Topical <User Schedule>  fentaNYL   Infusion. 1 MICROgram(s)/kG/Hr (7.93 mL/Hr) IV Continuous <Continuous>  folic acid 1 milliGRAM(s) Oral daily  midazolam Infusion 0.038 mG/kG/Hr (3 mL/Hr) IV Continuous <Continuous>  multivitamin 1 Tablet(s) Oral daily  nicotine - 21 mG/24Hr(s) Patch 1 patch Transdermal daily  norepinephrine Infusion 0.05 MICROgram(s)/kG/Min (7.434 mL/Hr) IV Continuous <Continuous>  pantoprazole Infusion 8 mG/Hr (10 mL/Hr) IV Continuous <Continuous>  propofol Infusion 50 MICROgram(s)/kG/Min (23.79 mL/Hr) IV Continuous <Continuous>  sodium chloride 0.9%. 1000 milliLiter(s) (75 mL/Hr) IV Continuous <Continuous>  thiamine Injectable 100 milliGRAM(s) IV Push daily    MEDICATIONS  (PRN):      ALLERGIES:  Allergies    No Known Allergies    Intolerances        LABS:                        9.2    8.26  )-----------( 157      ( 03 Aug 2019 06:40 )             27.4     08-03    141  |  115<H>  |  12  ----------------------------<  87  3.6   |  20<L>  |  0.59    Ca    7.9<L>      03 Aug 2019 02:30  Phos  3.2     08-03  Mg     2.0     08-03    TPro  4.3<L>  /  Alb  2.4<L>  /  TBili  0.4  /  DBili  x   /  AST  21  /  ALT  10  /  AlkPhos  43  08-03    PT/INR - ( 01 Aug 2019 21:35 )   PT: 11.2 SEC;   INR: 0.98          PTT - ( 01 Aug 2019 21:35 )  PTT:21.6 SEC      RADIOLOGY & ADDITIONAL TESTS: Reviewed. INTERVAL HPI/OVERNIGHT EVENTS: no acute events overnight.     SUBJECTIVE: Patient seen and examined at bedside. Patient remains intubated and sedated. Has maroon colored stool in rectal pouch, H/H stable.     ROS: unable to obtain due to sedation    OBJECTIVE:    VITAL SIGNS:  ICU Vital Signs Last 24 Hrs  T(C): 36.1 (03 Aug 2019 08:00), Max: 37.1 (02 Aug 2019 16:00)  T(F): 97 (03 Aug 2019 08:00), Max: 98.7 (02 Aug 2019 16:00)  HR: 59 (03 Aug 2019 10:00) (56 - 147)  BP: 93/66 (03 Aug 2019 10:00) (76/52 - 184/104)  BP(mean): 76 (03 Aug 2019 10:00) (60 - 127)  ABP: --  ABP(mean): --  RR: 14 (03 Aug 2019 10:00) (11 - 22)  SpO2: 100% (03 Aug 2019 10:00) (89% - 100%)    Mode: AC/ CMV (Assist Control/ Continuous Mandatory Ventilation), RR (machine): 14, TV (machine): 350, FiO2: 40, PEEP: 5, MAP: 7, PIP: 17    08-02 @ 07:01 - 08-03 @ 07:00  --------------------------------------------------------  IN: 921 mL / OUT: 1450 mL / NET: -529 mL    08-03 @ 07:01 - 08-03 @ 11:42  --------------------------------------------------------  IN: 340.8 mL / OUT: 0 mL / NET: 340.8 mL      CAPILLARY BLOOD GLUCOSE      POCT Blood Glucose.: 130 mg/dL (01 Aug 2019 21:48)      PHYSICAL EXAM:    General: intubated  HEENT: ETT in place  Neck: supple  Respiratory: CTA b/l  Cardiovascular: +S1/S2; RRR  Abdomen: soft, NT/ND; +BS x4  Extremities: WWP, 2+ peripheral pulses b/l; no LE edema  Skin: normal color and turgor; no rash  Neurological: sedated    MEDICATIONS:  MEDICATIONS  (STANDING):  chlorhexidine 0.12% Liquid 15 milliLiter(s) Oral Mucosa every 12 hours  chlorhexidine 4% Liquid 1 Application(s) Topical <User Schedule>  fentaNYL   Infusion. 1 MICROgram(s)/kG/Hr (7.93 mL/Hr) IV Continuous <Continuous>  folic acid 1 milliGRAM(s) Oral daily  midazolam Infusion 0.038 mG/kG/Hr (3 mL/Hr) IV Continuous <Continuous>  multivitamin 1 Tablet(s) Oral daily  nicotine - 21 mG/24Hr(s) Patch 1 patch Transdermal daily  norepinephrine Infusion 0.05 MICROgram(s)/kG/Min (7.434 mL/Hr) IV Continuous <Continuous>  pantoprazole Infusion 8 mG/Hr (10 mL/Hr) IV Continuous <Continuous>  propofol Infusion 50 MICROgram(s)/kG/Min (23.79 mL/Hr) IV Continuous <Continuous>  sodium chloride 0.9%. 1000 milliLiter(s) (75 mL/Hr) IV Continuous <Continuous>  thiamine Injectable 100 milliGRAM(s) IV Push daily    MEDICATIONS  (PRN):      ALLERGIES:  Allergies    No Known Allergies    Intolerances        LABS:                        9.2    8.26  )-----------( 157      ( 03 Aug 2019 06:40 )             27.4     08-03    141  |  115<H>  |  12  ----------------------------<  87  3.6   |  20<L>  |  0.59    Ca    7.9<L>      03 Aug 2019 02:30  Phos  3.2     08-03  Mg     2.0     08-03    TPro  4.3<L>  /  Alb  2.4<L>  /  TBili  0.4  /  DBili  x   /  AST  21  /  ALT  10  /  AlkPhos  43  08-03    PT/INR - ( 01 Aug 2019 21:35 )   PT: 11.2 SEC;   INR: 0.98          PTT - ( 01 Aug 2019 21:35 )  PTT:21.6 SEC      RADIOLOGY & ADDITIONAL TESTS: Reviewed.

## 2019-08-03 NOTE — PROGRESS NOTE ADULT - SUBJECTIVE AND OBJECTIVE BOX
GENERAL SURGERY DAILY PROGRESS NOTE:     Subjective:  Pt seen and examined. No acute events overnight. Pt w/ upper endoscopy, ulcer w/ clot, epi injected and cauterized, no active bleeding. h/h stable.     Objective:    MEDICATIONS  (STANDING):  chlorhexidine 0.12% Liquid 15 milliLiter(s) Oral Mucosa every 12 hours  chlorhexidine 4% Liquid 1 Application(s) Topical <User Schedule>  fentaNYL   Infusion. 1 MICROgram(s)/kG/Hr (7.93 mL/Hr) IV Continuous <Continuous>  folic acid 1 milliGRAM(s) Oral daily  midazolam Infusion 0.038 mG/kG/Hr (3 mL/Hr) IV Continuous <Continuous>  multivitamin 1 Tablet(s) Oral daily  nicotine - 21 mG/24Hr(s) Patch 1 patch Transdermal daily  norepinephrine Infusion 0.05 MICROgram(s)/kG/Min (7.434 mL/Hr) IV Continuous <Continuous>  pantoprazole Infusion 8 mG/Hr (10 mL/Hr) IV Continuous <Continuous>  propofol Infusion 50 MICROgram(s)/kG/Min (23.79 mL/Hr) IV Continuous <Continuous>  sodium chloride 0.9%. 1000 milliLiter(s) (75 mL/Hr) IV Continuous <Continuous>  thiamine Injectable 100 milliGRAM(s) IV Push daily    MEDICATIONS  (PRN):      Vital Signs Last 24 Hrs  T(C): 36.2 (03 Aug 2019 12:00), Max: 37.1 (02 Aug 2019 16:00)  T(F): 97.2 (03 Aug 2019 12:00), Max: 98.7 (02 Aug 2019 16:00)  HR: 61 (03 Aug 2019 12:00) (56 - 147)  BP: 86/63 (03 Aug 2019 12:00) (76/52 - 184/104)  BP(mean): 72 (03 Aug 2019 12:00) (60 - 127)  RR: 14 (03 Aug 2019 12:00) (11 - 22)  SpO2: 100% (03 Aug 2019 12:00) (89% - 100%)    I&O's Detail    02 Aug 2019 07:01  -  03 Aug 2019 07:00  --------------------------------------------------------  IN:    fentaNYL Infusion.: 294 mL    midazolam Infusion: 51 mL    norepinephrine Infusion: 6 mL    pantoprazole Infusion: 210 mL    propofol Infusion: 360 mL  Total IN: 921 mL    OUT:    Intermittent Catheterization - Urethral: 600 mL    Voided: 850 mL  Total OUT: 1450 mL    Total NET: -529 mL      03 Aug 2019 07:01  -  03 Aug 2019 12:44  --------------------------------------------------------  IN:    fentaNYL Infusion.: 22.8 mL    midazolam Infusion: 6 mL    pantoprazole Infusion: 30 mL    propofol Infusion: 57 mL    sodium chloride 0.9%.: 225 mL  Total IN: 340.8 mL    OUT:  Total OUT: 0 mL    Total NET: 340.8 mL    PHYSICAL EXAM  Neurology: Follows commands, PERRL  Neck: Neck supple, trachea midline, No JVD  Respiratory: CTA B/L  Abdomen: S/NT/ND, BSx4  Extremities: 2+ peripheral pulses bilat throughout; (-)edema appreciated      Daily     Daily Weight in k (03 Aug 2019 04:00)    LABS:                        9.2    8.26  )-----------( 157      ( 03 Aug 2019 06:40 )             27.4     08-03    141  |  115<H>  |  12  ----------------------------<  87  3.6   |  20<L>  |  0.59    Ca    7.9<L>      03 Aug 2019 02:30  Phos  3.2     08-03  Mg     2.0     08-03    TPro  4.3<L>  /  Alb  2.4<L>  /  TBili  0.4  /  DBili  x   /  AST  21  /  ALT  10  /  AlkPhos  43  08-03    PT/INR - ( 01 Aug 2019 21:35 )   PT: 11.2 SEC;   INR: 0.98          PTT - ( 01 Aug 2019 21:35 )  PTT:21.6 SEC      RADIOLOGY & ADDITIONAL STUDIES:

## 2019-08-03 NOTE — PROGRESS NOTE ADULT - SUBJECTIVE AND OBJECTIVE BOX
Chief Complaint:  Patient is a 58y old  Female who presents with a chief complaint of dizziness, GI Bleed , (03 Aug 2019 07:01)      Interval Events:   s/p bedside EGD yesterday showing proximal body ulcer with adherent clot that could not be removed. Epi injected and clot cauderized with bicap. No bleeding before or after procedure.  Hb stable this morning and vitals stable    Allergies:  No Known Allergies      Home Medications:    Hospital Medications:  chlorhexidine 0.12% Liquid 15 milliLiter(s) Oral Mucosa every 12 hours  chlorhexidine 4% Liquid 1 Application(s) Topical <User Schedule>  fentaNYL   Infusion. 1 MICROgram(s)/kG/Hr IV Continuous <Continuous>  folic acid 1 milliGRAM(s) Oral daily  midazolam Infusion 0.038 mG/kG/Hr IV Continuous <Continuous>  multivitamin 1 Tablet(s) Oral daily  nicotine - 21 mG/24Hr(s) Patch 1 patch Transdermal daily  norepinephrine Infusion 0.05 MICROgram(s)/kG/Min IV Continuous <Continuous>  pantoprazole Infusion 8 mG/Hr IV Continuous <Continuous>  propofol Infusion 50 MICROgram(s)/kG/Min IV Continuous <Continuous>  sodium chloride 0.9%. 1000 milliLiter(s) IV Continuous <Continuous>  thiamine Injectable 100 milliGRAM(s) IV Push daily      PMHX/PSHX:  Hypertension  ETOH abuse  Anxiety  H/O hernia repair      Family history:  FH: hypertension      ROS:     General:  No wt loss, fevers, chills, night sweats, fatigue,   Eyes:  Good vision, no reported pain  ENT:  No sore throat, pain, runny nose, dysphagia  CV:  No pain, palpitations, hypo/hypertension  Resp:  No dyspnea, cough, tachypnea, wheezing  GI:  No pain, No nausea, No vomiting, No diarrhea, No constipation, No weight loss, No fever, No pruritis, No rectal bleeding, No tarry stools, No dysphagia,  :  No pain, bleeding, incontinence, nocturia  Muscle:  No pain, weakness  Neuro:  No weakness, tingling, memory problems  Psych:  No fatigue, insomnia, mood problems, depression  Endocrine:  No polyuria, polydipsia, cold/heat intolerance  Heme:  No petechiae, ecchymosis, easy bruisability  Skin:  No rash, tattoos, scars, edema      PHYSICAL EXAM:   Vital Signs:  Vital Signs Last 24 Hrs  T(C): 36.1 (03 Aug 2019 04:00), Max: 37.1 (02 Aug 2019 16:00)  T(F): 97 (03 Aug 2019 04:00), Max: 98.7 (02 Aug 2019 16:00)  HR: 67 (03 Aug 2019 07:16) (56 - 147)  BP: 101/67 (03 Aug 2019 06:30) (84/58 - 184/104)  BP(mean): 79 (03 Aug 2019 06:30) (68 - 127)  RR: 14 (03 Aug 2019 06:30) (11 - 22)  SpO2: 99% (03 Aug 2019 07:16) (89% - 100%)  Daily Height in cm: 154.94 (02 Aug 2019 09:30)    Daily Weight in k (03 Aug 2019 04:00)    GENERAL:  NAD, intubated sedated  HEENT:  sclera anicteric  CHEST:  Full & symmetric excursion, no increased effort  HEART:  Regular rhythm, S1, S2  ABDOMEN:  Soft, non-tender, non-distended, normoactive bowel sounds,  no masses   EXTREMITIES:  no cyanosis,clubbing or edema  SKIN:  No rash/erythema/ecchymoses/petechiae/wounds/abscess/warm/dry      LABS:                        9.2    8.26  )-----------( 157      ( 03 Aug 2019 06:40 )             27.4     08-03    141  |  115<H>  |  12  ----------------------------<  87  3.6   |  20<L>  |  0.59    Ca    7.9<L>      03 Aug 2019 02:30  Phos  3.2     08-03  Mg     2.0     08-03    TPro  4.3<L>  /  Alb  2.4<L>  /  TBili  0.4  /  DBili  x   /  AST  21  /  ALT  10  /  AlkPhos  43  08-03    LIVER FUNCTIONS - ( 03 Aug 2019 02:30 )  Alb: 2.4 g/dL / Pro: 4.3 g/dL / ALK PHOS: 43 u/L / ALT: 10 u/L / AST: 21 u/L / GGT: x           PT/INR - ( 01 Aug 2019 21:35 )   PT: 11.2 SEC;   INR: 0.98          PTT - ( 01 Aug 2019 21:35 )  PTT:21.6 SEC        Imaging: Chief Complaint:  Patient is a 58y old  Female who presents with a chief complaint of dizziness, GI Bleed , (03 Aug 2019 07:01)      Interval Events:   s/p bedside EGD yesterday showing proximal body ulcer with adherent clot that could not be removed. Epi injected and clot cauderized with bicap. No bleeding before or after procedure.  Hb stable this morning and vitals stable  Reporting old red blood as per nursing    Allergies:  No Known Allergies      Home Medications:    Hospital Medications:  chlorhexidine 0.12% Liquid 15 milliLiter(s) Oral Mucosa every 12 hours  chlorhexidine 4% Liquid 1 Application(s) Topical <User Schedule>  fentaNYL   Infusion. 1 MICROgram(s)/kG/Hr IV Continuous <Continuous>  folic acid 1 milliGRAM(s) Oral daily  midazolam Infusion 0.038 mG/kG/Hr IV Continuous <Continuous>  multivitamin 1 Tablet(s) Oral daily  nicotine - 21 mG/24Hr(s) Patch 1 patch Transdermal daily  norepinephrine Infusion 0.05 MICROgram(s)/kG/Min IV Continuous <Continuous>  pantoprazole Infusion 8 mG/Hr IV Continuous <Continuous>  propofol Infusion 50 MICROgram(s)/kG/Min IV Continuous <Continuous>  sodium chloride 0.9%. 1000 milliLiter(s) IV Continuous <Continuous>  thiamine Injectable 100 milliGRAM(s) IV Push daily      PMHX/PSHX:  Hypertension  ETOH abuse  Anxiety  H/O hernia repair      Family history:  FH: hypertension      ROS:     General:  No wt loss, fevers, chills, night sweats, fatigue,   Eyes:  Good vision, no reported pain  ENT:  No sore throat, pain, runny nose, dysphagia  CV:  No pain, palpitations, hypo/hypertension  Resp:  No dyspnea, cough, tachypnea, wheezing  GI:  No pain, No nausea, No vomiting, No diarrhea, No constipation, No weight loss, No fever, No pruritis, No rectal bleeding, No tarry stools, No dysphagia,  :  No pain, bleeding, incontinence, nocturia  Muscle:  No pain, weakness  Neuro:  No weakness, tingling, memory problems  Psych:  No fatigue, insomnia, mood problems, depression  Endocrine:  No polyuria, polydipsia, cold/heat intolerance  Heme:  No petechiae, ecchymosis, easy bruisability  Skin:  No rash, tattoos, scars, edema      PHYSICAL EXAM:   Vital Signs:  Vital Signs Last 24 Hrs  T(C): 36.1 (03 Aug 2019 04:00), Max: 37.1 (02 Aug 2019 16:00)  T(F): 97 (03 Aug 2019 04:00), Max: 98.7 (02 Aug 2019 16:00)  HR: 67 (03 Aug 2019 07:16) (56 - 147)  BP: 101/67 (03 Aug 2019 06:30) (84/58 - 184/104)  BP(mean): 79 (03 Aug 2019 06:30) (68 - 127)  RR: 14 (03 Aug 2019 06:30) (11 - 22)  SpO2: 99% (03 Aug 2019 07:16) (89% - 100%)  Daily Height in cm: 154.94 (02 Aug 2019 09:30)    Daily Weight in k (03 Aug 2019 04:00)    GENERAL:  NAD, intubated sedated  HEENT:  sclera anicteric  CHEST:  Full & symmetric excursion, no increased effort  HEART:  Regular rhythm, S1, S2  ABDOMEN:  Soft, non-tender, non-distended, normoactive bowel sounds,  no masses   EXTREMITIES:  no cyanosis,clubbing or edema  SKIN:  No rash/erythema/ecchymoses/petechiae/wounds/abscess/warm/dry      LABS:                        9.2    8.26  )-----------( 157      ( 03 Aug 2019 06:40 )             27.4     08-03    141  |  115<H>  |  12  ----------------------------<  87  3.6   |  20<L>  |  0.59    Ca    7.9<L>      03 Aug 2019 02:30  Phos  3.2     08-03  Mg     2.0     08-03    TPro  4.3<L>  /  Alb  2.4<L>  /  TBili  0.4  /  DBili  x   /  AST  21  /  ALT  10  /  AlkPhos  43  08-03    LIVER FUNCTIONS - ( 03 Aug 2019 02:30 )  Alb: 2.4 g/dL / Pro: 4.3 g/dL / ALK PHOS: 43 u/L / ALT: 10 u/L / AST: 21 u/L / GGT: x           PT/INR - ( 01 Aug 2019 21:35 )   PT: 11.2 SEC;   INR: 0.98          PTT - ( 01 Aug 2019 21:35 )  PTT:21.6 SEC        Imaging:

## 2019-08-03 NOTE — PROGRESS NOTE ADULT - ASSESSMENT
58F w/ heavy ETOH use, daily smoker, HTN, and anxiety who presents to the ED w/ dizziness and nausea x 1 week found ot have melena and hematemesisi n the ED now with suspicious pseudoaneurysm on CT. GI consulted for evaluation.      1) Hematemesis/Melena  Patient presenting with hematemesis and vomiting, hypotension, Hb of 5.9 on admission. Now s/p 5u PRBC with Hb of 10.2. CT angio showing polypoid extravasation of contrast in the stomach with containment of contrast on delayed phase suggestive of possible pseudoaneurysm. s/p bedside EGD yesterday showing proximal body ulcer with adherent clot that could not be removed. Epi injected and clot cauderized with bicap. No bleeding before or after procedure.  Hb and vitals currently stable    - serial CBC and transfuse of Hb<7  - PPI gtt x72 hours and then can switch to PPI BID  - if patient rebleeds would consider IR consult  - will need repeat EGD in 8 weeks for ulcer surveillance  - rest of plan as per primary team 58F w/ heavy ETOH use, daily smoker, HTN, and anxiety who presents to the ED w/ dizziness and nausea x 1 week found ot have melena and hematemesisi n the ED now with suspicious pseudoaneurysm on CT. GI consulted for evaluation.      1) Hematemesis/Melena  Patient presenting with hematemesis and vomiting, hypotension, Hb of 5.9 on admission. Now s/p 5u PRBC with Hb of 10.2. CT angio showing polypoid extravasation of contrast in the stomach with containment of contrast on delayed phase suggestive of possible pseudoaneurysm. s/p bedside EGD yesterday showing proximal body ulcer with adherent clot that could not be removed. Epi injected and clot cauderized with bicap. No bleeding before or after procedure.  Hb and vitals currently stable    - serial CBC and transfuse of Hb<7  - PPI gtt x72 hours and then can switch to PPI BID  - please keep patient intubated for now  - if patient rebleeds would consider IR consult  - will need repeat EGD in 8 weeks for ulcer surveillance  - rest of plan as per primary team

## 2019-08-03 NOTE — CHART NOTE - NSCHARTNOTEFT_GEN_A_CORE
Patient is a 58y old  Female who presents with a chief complaint of dizziness, GI Bleed , (03 Aug 2019 12:43)     Briefly, this is a 58 year old female with HTN, ETOH abuse, presenting with hematemesis, found to have anemia to Hgb 5.9. CTA abdomen showed active bleeding in stomach, and pseudoaneurysm. Patient admitted to MICU as she required intubation for EGD procedure (per GI team, and PPI gtt was administered), where gastric ulcer with adherent clot was identified and treated with APC and epinephrine. Patient extubated 8/3, currently patient is alert and oriented x 3, denies any complaints aside from slight dizziness, denies active bleeding, nausea, vomiting, or abdominal pain. Hgb stable at 9.5 at this time.     MEDICATIONS  (STANDING):  folic acid 1 milliGRAM(s) Oral daily  multivitamin 1 Tablet(s) Oral daily  nicotine - 21 mG/24Hr(s) Patch 1 patch Transdermal daily  pantoprazole Infusion 8 mG/Hr (10 mL/Hr) IV Continuous <Continuous>  sodium chloride 0.9%. 1000 milliLiter(s) (75 mL/Hr) IV Continuous <Continuous>  thiamine Injectable 100 milliGRAM(s) IV Push daily    MEDICATIONS  (PRN):      Allergies    No Known Allergies    Intolerances      Vital Signs Last 24 Hrs  T(C): 37.1 (03 Aug 2019 20:00), Max: 37.1 (03 Aug 2019 20:00)  T(F): 98.8 (03 Aug 2019 20:00), Max: 98.8 (03 Aug 2019 20:00)  HR: 84 (03 Aug 2019 20:00) (56 - 111)  BP: 117/78 (03 Aug 2019 20:00) (76/52 - 131/82)  BP(mean): 91 (03 Aug 2019 20:00) (60 - 99)  RR: 15 (03 Aug 2019 20:00) (11 - 16)  SpO2: 100% (03 Aug 2019 20:00) (99% - 100%)    PHYSICAL EXAM:  GENERAL: NAD, well-groomed, well-developed  HEAD:  Atraumatic, Normocephalic  EYES: EOMI, PERRLA, conjunctiva and sclera clear  ENMT: moist mucous membranes   NERVOUS SYSTEM: AOX3  PSYCHIATRIC: Appropriate affect and mood  CHEST/LUNG: Clear to auscultation bilaterally  HEART: Regular rate and rhythm; No murmurs, rubs, or gallops. No LE edema  ABDOMEN: Soft, Nontender, Nondistended; Bowel sounds present  EXTREMITIES:  2+ Peripheral Pulses, No clubbing, cyanosis      LABS:                        9.5    8.79  )-----------( 162      ( 03 Aug 2019 13:00 )             28.3     03 Aug 2019 02:30    141    |  115    |  12     ----------------------------<  87     3.6     |  20     |  0.59     Ca    7.9        03 Aug 2019 02:30  Phos  3.2       03 Aug 2019 02:30  Mg     2.0       03 Aug 2019 02:30    TPro  4.3    /  Alb  2.4    /  TBili  0.4    /  DBili  x      /  AST  21     /  ALT  10     /  AlkPhos  43     03 Aug 2019 02:30    PT/INR - ( 01 Aug 2019 21:35 )   PT: 11.2 SEC;   INR: 0.98          PTT - ( 01 Aug 2019 21:35 )  PTT:21.6 SEC  CAPILLARY BLOOD GLUCOSE        BLOOD CULTURE    RADIOLOGY & ADDITIONAL TESTS:    Imaging Personally Reviewed:  [ ] YES     Consultant(s) Notes Reviewed:      Care Discussed with Consultants/Other Providers:      Assessment and Plan: 58F with HTN, smoker, hx of alcohol abuse presenting with symptomatic anemia to Hgb 5.9 2/2 active GI bleed (gastric ulcer) s/p EGD with epinephrine, APC with intubation for procedure. Pt now extubated, ready for transfer out of MICU.     #Neuro-   monitor for alcohol withdrawal given patient with history of alcohol abuse.  Will start symptom triggered CIWA.     #CV  -normally hypertensive (on Atenolol and Lisinopril at home)  -hold anti-hypertensives, BP controlled at this time, 110's systolic.     #Pulm - now extubated, no active issues    #GI  -Pt with symptomatic anemia on admission: Hgb initially down to 5.9 with hematemesis and melena 2/2 to GIB  -CTA demonstrating active bleeding from pseudoaneurysm of gastric antrum  -IR consulted- recommended GI endoscopic evaluation prior to invasive procedure such as embolization   -s/p MICU admission for elective intubation for endoscopy by GI   -per Surgical evaluation, no surgical  interventions   -s/p 5 units pRBC, 1U plasma, 1U platelets  - s/p EGD on 8/2 with gastric ulcer with adherent clot, treated with apc and epi, unable to fully remove clot  - continue protonix gtt x72 hrs (until 8/5), H/H stable      #ID  -leukocytosis and tachycardia likely 2/2 GIB  -unlikely infection, no localizing sx or fever   -will pan culture if febrile     #  -no active issues    #Endo  -no active issues    #Heme  -anemia 2/2 to GIB   -continue to transfuse PRN for active bleeding with symptoms (hypotension, or lightheadedness) OR Hgb<7  -can check Hgb q12h now    #DVT ppx  -SCDs (no pharmacologic ppx in setting of GI bleed on admission)      For Follow-Up:  [ ] monitor H/H  [ ] f/u further GI recs  [ ] monitor for alcohol w/d - start on CIWA when transferred to floor Patient is a 58y old  Female who presents with a chief complaint of dizziness, GI Bleed , (03 Aug 2019 12:43)     Briefly, this is a 58 year old female with HTN, ETOH abuse, presenting with hematemesis, found to have anemia to Hgb 5.9. CTA abdomen showed active bleeding in stomach, and pseudoaneurysm. Patient admitted to MICU as she required intubation for EGD procedure (per GI team, and PPI gtt was administered), where gastric ulcer with adherent clot was identified and treated with APC and epinephrine. Patient extubated 8/3, currently patient is alert and oriented x 3, denies any complaints aside from slight dizziness, denies active bleeding, nausea, vomiting, or abdominal pain. Hgb stable at 9.5 at this time.     MEDICATIONS  (STANDING):  folic acid 1 milliGRAM(s) Oral daily  multivitamin 1 Tablet(s) Oral daily  nicotine - 21 mG/24Hr(s) Patch 1 patch Transdermal daily  pantoprazole Infusion 8 mG/Hr (10 mL/Hr) IV Continuous <Continuous>  sodium chloride 0.9%. 1000 milliLiter(s) (75 mL/Hr) IV Continuous <Continuous>  thiamine Injectable 100 milliGRAM(s) IV Push daily    MEDICATIONS  (PRN):      Allergies    No Known Allergies    Intolerances      Vital Signs Last 24 Hrs  T(C): 37.1 (03 Aug 2019 20:00), Max: 37.1 (03 Aug 2019 20:00)  T(F): 98.8 (03 Aug 2019 20:00), Max: 98.8 (03 Aug 2019 20:00)  HR: 84 (03 Aug 2019 20:00) (56 - 111)  BP: 117/78 (03 Aug 2019 20:00) (76/52 - 131/82)  BP(mean): 91 (03 Aug 2019 20:00) (60 - 99)  RR: 15 (03 Aug 2019 20:00) (11 - 16)  SpO2: 100% (03 Aug 2019 20:00) (99% - 100%)    PHYSICAL EXAM:  GENERAL: NAD, well-groomed, well-developed  HEAD:  Atraumatic, Normocephalic  EYES: EOMI, PERRLA, conjunctiva and sclera clear  ENMT: moist mucous membranes   NERVOUS SYSTEM: AOX3  PSYCHIATRIC: Appropriate affect and mood  CHEST/LUNG: Clear to auscultation bilaterally  HEART: Regular rate and rhythm; No murmurs, rubs, or gallops. No LE edema  ABDOMEN: Soft, Nontender, Nondistended; Bowel sounds present  EXTREMITIES:  2+ Peripheral Pulses, No clubbing, cyanosis      LABS:                        9.5    8.79  )-----------( 162      ( 03 Aug 2019 13:00 )             28.3     03 Aug 2019 02:30    141    |  115    |  12     ----------------------------<  87     3.6     |  20     |  0.59     Ca    7.9        03 Aug 2019 02:30  Phos  3.2       03 Aug 2019 02:30  Mg     2.0       03 Aug 2019 02:30    TPro  4.3    /  Alb  2.4    /  TBili  0.4    /  DBili  x      /  AST  21     /  ALT  10     /  AlkPhos  43     03 Aug 2019 02:30    PT/INR - ( 01 Aug 2019 21:35 )   PT: 11.2 SEC;   INR: 0.98          PTT - ( 01 Aug 2019 21:35 )  PTT:21.6 SEC  CAPILLARY BLOOD GLUCOSE        BLOOD CULTURE    RADIOLOGY & ADDITIONAL TESTS:    Imaging Personally Reviewed:  [ ] YES     Consultant(s) Notes Reviewed:      Care Discussed with Consultants/Other Providers:      Assessment and Plan: 58F with HTN, smoker, hx of alcohol abuse presenting with symptomatic anemia to Hgb 5.9 2/2 active GI bleed (gastric ulcer) s/p EGD with epinephrine, APC with intubation for procedure. Pt now extubated, ready for transfer out of MICU.     #Neuro-   monitor for alcohol withdrawal given patient with history of alcohol abuse.  Will start symptom triggered CIWA.     #CV  -normally hypertensive (on Atenolol and Lisinopril at home)  -hold anti-hypertensives, BP controlled at this time, 110's systolic.     #Pulm - now extubated, no active issues    #GI  -Pt with symptomatic anemia on admission: Hgb initially down to 5.9 with hematemesis and melena 2/2 to GIB  -CTA demonstrating active bleeding from pseudoaneurysm of gastric antrum  -IR consulted- recommended GI endoscopic evaluation prior to invasive procedure such as embolization   -s/p MICU admission for elective intubation for endoscopy by GI   -per Surgical evaluation, no surgical  interventions   -s/p 5 units pRBC, 1U plasma, 1U platelets  - s/p EGD on 8/2 with gastric ulcer with adherent clot, treated with apc and epi, unable to fully remove clot  - continue protonix gtt x72 hrs (until 8/5), H/H stable  - patient remains NPO for now- can do bedside swallow eval in AM (pt was just extubated today)      #ID  -leukocytosis and tachycardia likely 2/2 GIB  -unlikely infection, no localizing sx or fever   -will pan culture if febrile     #  -no active issues    #Endo  -no active issues    #Heme  -anemia 2/2 to GIB   -continue to transfuse PRN for active bleeding with symptoms (hypotension, or lightheadedness) OR Hgb<7  -can check Hgb q12h now    #DVT ppx  -SCDs (no pharmacologic ppx in setting of GI bleed on admission)      For Follow-Up:  [ ] monitor H/H  [ ] f/u further GI recs  [ ] bedside swallow eval in AM if pt H/H remains stable and no further bleeding, pt remains NPO for now  [ ] monitor for alcohol w/d - start on CIWA when transferred to floor Patient is a 58y old  Female who presents with a chief complaint of dizziness, GI Bleed , (03 Aug 2019 12:43)     Briefly, this is a 58 year old female with HTN, ETOH abuse, presenting with hematemesis, found to have anemia to Hgb 5.9. CTA abdomen showed active bleeding in stomach, and pseudoaneurysm. Patient admitted to MICU as she required intubation for EGD procedure (per GI team, and PPI gtt was administered), where gastric ulcer with adherent clot was identified and treated with APC and epinephrine. Patient extubated 8/3, currently patient is alert and oriented x 3, denies any complaints aside from slight dizziness, denies active bleeding, nausea, vomiting, or abdominal pain. Hgb stable at 9.5 at this time.     MEDICATIONS  (STANDING):  folic acid 1 milliGRAM(s) Oral daily  multivitamin 1 Tablet(s) Oral daily  nicotine - 21 mG/24Hr(s) Patch 1 patch Transdermal daily  pantoprazole Infusion 8 mG/Hr (10 mL/Hr) IV Continuous <Continuous>  sodium chloride 0.9%. 1000 milliLiter(s) (75 mL/Hr) IV Continuous <Continuous>  thiamine Injectable 100 milliGRAM(s) IV Push daily    MEDICATIONS  (PRN):      Allergies    No Known Allergies    Intolerances      Vital Signs Last 24 Hrs  T(C): 37.1 (03 Aug 2019 20:00), Max: 37.1 (03 Aug 2019 20:00)  T(F): 98.8 (03 Aug 2019 20:00), Max: 98.8 (03 Aug 2019 20:00)  HR: 84 (03 Aug 2019 20:00) (56 - 111)  BP: 117/78 (03 Aug 2019 20:00) (76/52 - 131/82)  BP(mean): 91 (03 Aug 2019 20:00) (60 - 99)  RR: 15 (03 Aug 2019 20:00) (11 - 16)  SpO2: 100% (03 Aug 2019 20:00) (99% - 100%)    PHYSICAL EXAM:  GENERAL: NAD, well-groomed, well-developed  HEAD:  Atraumatic, Normocephalic  EYES: EOMI, PERRLA, conjunctiva and sclera clear  ENMT: moist mucous membranes   NERVOUS SYSTEM: AOX3  PSYCHIATRIC: Appropriate affect and mood  CHEST/LUNG: Clear to auscultation bilaterally  HEART: Regular rate and rhythm; No murmurs, rubs, or gallops. No LE edema  ABDOMEN: Soft, Nontender, Nondistended; Bowel sounds present  EXTREMITIES:  2+ Peripheral Pulses, No clubbing, cyanosis      LABS:                        9.5    8.79  )-----------( 162      ( 03 Aug 2019 13:00 )             28.3     03 Aug 2019 02:30    141    |  115    |  12     ----------------------------<  87     3.6     |  20     |  0.59     Ca    7.9        03 Aug 2019 02:30  Phos  3.2       03 Aug 2019 02:30  Mg     2.0       03 Aug 2019 02:30    TPro  4.3    /  Alb  2.4    /  TBili  0.4    /  DBili  x      /  AST  21     /  ALT  10     /  AlkPhos  43     03 Aug 2019 02:30    PT/INR - ( 01 Aug 2019 21:35 )   PT: 11.2 SEC;   INR: 0.98          PTT - ( 01 Aug 2019 21:35 )  PTT:21.6 SEC  CAPILLARY BLOOD GLUCOSE        BLOOD CULTURE    RADIOLOGY & ADDITIONAL TESTS:    Imaging Personally Reviewed:  [ ] YES     Consultant(s) Notes Reviewed:      Care Discussed with Consultants/Other Providers:      Assessment and Plan: 58F with HTN, smoker, hx of alcohol abuse presenting with symptomatic anemia to Hgb 5.9 2/2 active GI bleed (gastric ulcer) s/p EGD with epinephrine, APC with intubation for procedure. Pt now extubated, ready for transfer out of MICU.     #Neuro-   monitor for alcohol withdrawal given patient with history of alcohol abuse.  Will start symptom triggered CIWA.     #CV  -normally hypertensive (on Atenolol and Lisinopril at home)  -hold anti-hypertensives, BP controlled at this time, 110's systolic.     #Pulm - now extubated, no active issues    #GI  -Pt with symptomatic anemia on admission: Hgb initially down to 5.9 with hematemesis and melena 2/2 to GIB  -CTA demonstrating active bleeding from pseudoaneurysm of gastric antrum  -IR consulted- recommended GI endoscopic evaluation prior to invasive procedure such as embolization   -s/p MICU admission for elective intubation for endoscopy by GI   -per Surgical evaluation, no surgical  interventions   -s/p 5 units pRBC, 1U plasma, 1U platelets  - s/p EGD on 8/2 with gastric ulcer with adherent clot, treated with apc and epi, unable to fully remove clot  - continue protonix gtt x72 hrs (until 8/5), H/H stable  - patient remains NPO for now- can do bedside swallow eval in AM (pt was just extubated today)      #ID  -leukocytosis and tachycardia likely 2/2 GIB  -unlikely infection, no localizing sx or fever   -will pan culture if febrile     #  -no active issues    #Endo  -no active issues    #Heme  -anemia 2/2 to GIB   -continue to transfuse PRN for active bleeding with symptoms (hypotension, or lightheadedness) OR Hgb<7  -can check Hgb q12h now (next ordered with AM labs)    #DVT ppx  -SCDs (no pharmacologic ppx in setting of GI bleed on admission)      For Follow-Up:  [ ] monitor H/H  [ ] f/u further GI recs  [ ] bedside swallow eval in AM if pt H/H remains stable and no further bleeding, pt remains NPO for now  [ ] monitor for alcohol w/d - symptom triggered CIWA protocol ordered.

## 2019-08-03 NOTE — PROGRESS NOTE ADULT - ATTENDING COMMENTS
ETOH use admitted with UGIB c/b respiratory failure  transfused 5 u prbcs    hgb stable  off pressors    re-evaluated multiple times today  sedation weaned    ultimately extubated this afternoon under my direction. tolerating extubation well.

## 2019-08-04 LAB
ANION GAP SERPL CALC-SCNC: 11 MMO/L — SIGNIFICANT CHANGE UP (ref 7–14)
BUN SERPL-MCNC: 10 MG/DL — SIGNIFICANT CHANGE UP (ref 7–23)
CALCIUM SERPL-MCNC: 7.9 MG/DL — LOW (ref 8.4–10.5)
CHLORIDE SERPL-SCNC: 112 MMOL/L — HIGH (ref 98–107)
CO2 SERPL-SCNC: 19 MMOL/L — LOW (ref 22–31)
CREAT SERPL-MCNC: 0.59 MG/DL — SIGNIFICANT CHANGE UP (ref 0.5–1.3)
GLUCOSE BLDC GLUCOMTR-MCNC: 93 MG/DL — SIGNIFICANT CHANGE UP (ref 70–99)
GLUCOSE BLDC GLUCOMTR-MCNC: 99 MG/DL — SIGNIFICANT CHANGE UP (ref 70–99)
GLUCOSE SERPL-MCNC: 99 MG/DL — SIGNIFICANT CHANGE UP (ref 70–99)
HCT VFR BLD CALC: 29.1 % — LOW (ref 34.5–45)
HCV AB S/CO SERPL IA: 0.1 S/CO — SIGNIFICANT CHANGE UP (ref 0–0.99)
HCV AB SERPL-IMP: SIGNIFICANT CHANGE UP
HGB BLD-MCNC: 9.8 G/DL — LOW (ref 11.5–15.5)
MAGNESIUM SERPL-MCNC: 2 MG/DL — SIGNIFICANT CHANGE UP (ref 1.6–2.6)
MCHC RBC-ENTMCNC: 29.4 PG — SIGNIFICANT CHANGE UP (ref 27–34)
MCHC RBC-ENTMCNC: 33.7 % — SIGNIFICANT CHANGE UP (ref 32–36)
MCV RBC AUTO: 87.4 FL — SIGNIFICANT CHANGE UP (ref 80–100)
NRBC # FLD: 0 K/UL — SIGNIFICANT CHANGE UP (ref 0–0)
PHOSPHATE SERPL-MCNC: 3.9 MG/DL — SIGNIFICANT CHANGE UP (ref 2.5–4.5)
PLATELET # BLD AUTO: 189 K/UL — SIGNIFICANT CHANGE UP (ref 150–400)
PMV BLD: 9 FL — SIGNIFICANT CHANGE UP (ref 7–13)
POTASSIUM SERPL-MCNC: 3.2 MMOL/L — LOW (ref 3.5–5.3)
POTASSIUM SERPL-SCNC: 3.2 MMOL/L — LOW (ref 3.5–5.3)
RBC # BLD: 3.33 M/UL — LOW (ref 3.8–5.2)
RBC # FLD: 19.2 % — HIGH (ref 10.3–14.5)
SODIUM SERPL-SCNC: 142 MMOL/L — SIGNIFICANT CHANGE UP (ref 135–145)
WBC # BLD: 9.34 K/UL — SIGNIFICANT CHANGE UP (ref 3.8–10.5)
WBC # FLD AUTO: 9.34 K/UL — SIGNIFICANT CHANGE UP (ref 3.8–10.5)

## 2019-08-04 PROCEDURE — 99233 SBSQ HOSP IP/OBS HIGH 50: CPT

## 2019-08-04 RX ORDER — POTASSIUM CHLORIDE 20 MEQ
40 PACKET (EA) ORAL ONCE
Refills: 0 | Status: COMPLETED | OUTPATIENT
Start: 2019-08-04 | End: 2019-08-04

## 2019-08-04 RX ORDER — ACETAMINOPHEN 500 MG
650 TABLET ORAL EVERY 6 HOURS
Refills: 0 | Status: DISCONTINUED | OUTPATIENT
Start: 2019-08-04 | End: 2019-08-06

## 2019-08-04 RX ORDER — CHLORHEXIDINE GLUCONATE 213 G/1000ML
1 SOLUTION TOPICAL DAILY
Refills: 0 | Status: DISCONTINUED | OUTPATIENT
Start: 2019-08-04 | End: 2019-08-06

## 2019-08-04 RX ORDER — PANTOPRAZOLE SODIUM 20 MG/1
40 TABLET, DELAYED RELEASE ORAL
Refills: 0 | Status: DISCONTINUED | OUTPATIENT
Start: 2019-08-04 | End: 2019-08-06

## 2019-08-04 RX ADMIN — SODIUM CHLORIDE 75 MILLILITER(S): 9 INJECTION INTRAMUSCULAR; INTRAVENOUS; SUBCUTANEOUS at 22:32

## 2019-08-04 RX ADMIN — Medication 650 MILLIGRAM(S): at 23:40

## 2019-08-04 RX ADMIN — Medication 650 MILLIGRAM(S): at 22:32

## 2019-08-04 RX ADMIN — Medication 1 PATCH: at 07:02

## 2019-08-04 RX ADMIN — Medication 1 MILLIGRAM(S): at 11:24

## 2019-08-04 RX ADMIN — Medication 1 PATCH: at 11:24

## 2019-08-04 RX ADMIN — Medication 650 MILLIGRAM(S): at 17:25

## 2019-08-04 RX ADMIN — Medication 100 MILLIGRAM(S): at 11:24

## 2019-08-04 RX ADMIN — PANTOPRAZOLE SODIUM 10 MG/HR: 20 TABLET, DELAYED RELEASE ORAL at 15:16

## 2019-08-04 RX ADMIN — PANTOPRAZOLE SODIUM 10 MG/HR: 20 TABLET, DELAYED RELEASE ORAL at 22:32

## 2019-08-04 RX ADMIN — Medication 1 TABLET(S): at 11:24

## 2019-08-04 RX ADMIN — Medication 1 PATCH: at 11:25

## 2019-08-04 RX ADMIN — Medication 1 PATCH: at 19:39

## 2019-08-04 RX ADMIN — Medication 650 MILLIGRAM(S): at 07:51

## 2019-08-04 RX ADMIN — Medication 40 MILLIEQUIVALENT(S): at 11:24

## 2019-08-04 RX ADMIN — Medication 650 MILLIGRAM(S): at 16:24

## 2019-08-04 RX ADMIN — Medication 650 MILLIGRAM(S): at 08:50

## 2019-08-04 RX ADMIN — PANTOPRAZOLE SODIUM 10 MG/HR: 20 TABLET, DELAYED RELEASE ORAL at 06:23

## 2019-08-04 NOTE — CHART NOTE - NSCHARTNOTEFT_GEN_A_CORE
d/w GI fellow ok to advance diet to clears today  8/5 ok to adv to full in am then soft in the evening   d/w medical attending d/w GI fellow ok to advance diet to clears today  8/5 ok to adv to full in am then soft in the evening   please dc protonix gtt tonight at 10pm ok in am ok to change protonix 40mg IVP q 12 hours   d/w medical attending

## 2019-08-04 NOTE — PROGRESS NOTE ADULT - SUBJECTIVE AND OBJECTIVE BOX
Patient is a 58y old  Female who presents with a chief complaint of dizziness, GI Bleed , (03 Aug 2019 12:43)        SUBJECTIVE / OVERNIGHT EVENTS:      MEDICATIONS  (STANDING):  folic acid 1 milliGRAM(s) Oral daily  multivitamin 1 Tablet(s) Oral daily  nicotine - 21 mG/24Hr(s) Patch 1 patch Transdermal daily  pantoprazole Infusion 8 mG/Hr (10 mL/Hr) IV Continuous <Continuous>  potassium chloride    Tablet ER 40 milliEquivalent(s) Oral once  sodium chloride 0.9%. 1000 milliLiter(s) (75 mL/Hr) IV Continuous <Continuous>  thiamine Injectable 100 milliGRAM(s) IV Push daily    MEDICATIONS  (PRN):  acetaminophen   Tablet .. 650 milliGRAM(s) Oral every 6 hours PRN Mild Pain (1 - 3)  LORazepam   Injectable 2 milliGRAM(s) IV Push every 2 hours PRN CIWA-Ar score increase by 2 points and a total score of 7 or less  LORazepam   Injectable 2 milliGRAM(s) IV Push every 1 hour PRN CIWA-Ar score 8 or greater      Vital Signs Last 24 Hrs  T(C): 36.7 (04 Aug 2019 07:52), Max: 37.3 (03 Aug 2019 23:10)  T(F): 98.1 (04 Aug 2019 07:52), Max: 99.2 (03 Aug 2019 23:10)  HR: 98 (04 Aug 2019 07:52) (58 - 111)  BP: 135/83 (04 Aug 2019 07:52) (86/63 - 147/83)  BP(mean): 86 (03 Aug 2019 21:00) (72 - 99)  RR: 18 (04 Aug 2019 07:52) (11 - 18)  SpO2: 97% (04 Aug 2019 07:52) (94% - 100%)  CAPILLARY BLOOD GLUCOSE      POCT Blood Glucose.: 99 mg/dL (04 Aug 2019 06:13)    I&O's Summary    03 Aug 2019 07:01  -  04 Aug 2019 07:00  --------------------------------------------------------  IN: 1191.6 mL / OUT: 1725 mL / NET: -533.4 mL          PHYSICAL EXAM  GENERAL: NAD, well-developed  HEAD:  Atraumatic, Normocephalic  EYES: EOMI, PERRLA, conjunctiva and sclera clear  NECK: Supple, No JVD  CHEST/LUNG: Clear to auscultation bilaterally; No wheeze  HEART: Regular rate and rhythm; No murmurs, rubs, or gallops  ABDOMEN: Soft, Nontender, Nondistended; Bowel sounds present  EXTREMITIES:  2+ Peripheral Pulses, No clubbing, cyanosis, or edema  PSYCH: AAOx3  SKIN: No rashes or lesions    LABS:                        9.8    9.34  )-----------( 189      ( 04 Aug 2019 04:45 )             29.1     08-04    142  |  112<H>  |  10  ----------------------------<  99  3.2<L>   |  19<L>  |  0.59    Ca    7.9<L>      04 Aug 2019 04:45  Phos  3.9     08-04  Mg     2.0     08-04    TPro  4.3<L>  /  Alb  2.4<L>  /  TBili  0.4  /  DBili  x   /  AST  21  /  ALT  10  /  AlkPhos  43  08-03              RADIOLOGY & ADDITIONAL TESTS:    Imaging Personally Reviewed:  Consultant(s) Notes Reviewed:    Care Discussed with Consultants/Other Providers: Patient is a 58y old  Female who presents with a chief complaint of dizziness, GI Bleed , (03 Aug 2019 12:43)    SUBJECTIVE / OVERNIGHT EVENTS:  Patient has no acute complaints. No n/v or abdominal. She reports having an appetite. No fever or chills. No chest pain or SOB.     MEDICATIONS  (STANDING):  folic acid 1 milliGRAM(s) Oral daily  multivitamin 1 Tablet(s) Oral daily  nicotine - 21 mG/24Hr(s) Patch 1 patch Transdermal daily  pantoprazole Infusion 8 mG/Hr (10 mL/Hr) IV Continuous <Continuous>  potassium chloride    Tablet ER 40 milliEquivalent(s) Oral once  sodium chloride 0.9%. 1000 milliLiter(s) (75 mL/Hr) IV Continuous <Continuous>  thiamine Injectable 100 milliGRAM(s) IV Push daily    MEDICATIONS  (PRN):  acetaminophen   Tablet .. 650 milliGRAM(s) Oral every 6 hours PRN Mild Pain (1 - 3)  LORazepam   Injectable 2 milliGRAM(s) IV Push every 2 hours PRN CIWA-Ar score increase by 2 points and a total score of 7 or less  LORazepam   Injectable 2 milliGRAM(s) IV Push every 1 hour PRN CIWA-Ar score 8 or greater      Vital Signs Last 24 Hrs  T(C): 36.7 (04 Aug 2019 07:52), Max: 37.3 (03 Aug 2019 23:10)  T(F): 98.1 (04 Aug 2019 07:52), Max: 99.2 (03 Aug 2019 23:10)  HR: 98 (04 Aug 2019 07:52) (58 - 111)  BP: 135/83 (04 Aug 2019 07:52) (86/63 - 147/83)  BP(mean): 86 (03 Aug 2019 21:00) (72 - 99)  RR: 18 (04 Aug 2019 07:52) (11 - 18)  SpO2: 97% (04 Aug 2019 07:52) (94% - 100%)  CAPILLARY BLOOD GLUCOSE      POCT Blood Glucose.: 99 mg/dL (04 Aug 2019 06:13)    I&O's Summary    03 Aug 2019 07:01  -  04 Aug 2019 07:00  --------------------------------------------------------  IN: 1191.6 mL / OUT: 1725 mL / NET: -533.4 mL          PHYSICAL EXAM  GENERAL: NAD  CHEST/LUNG: Clear to auscultation bilaterally; No wheeze  HEART: Regular rate and rhythm; No murmurs, rubs, or gallops  ABDOMEN: Soft, Nontender, Nondistended; Bowel sounds present  EXTREMITIES:  2+ Peripheral Pulses, No clubbing, cyanosis. Mild edema of left hand.   : Cannon catheter in place draining yellow urine. Rectal tube with melena in collecting reservoir.   PSYCH: AAOx3      LABS:                        9.8    9.34  )-----------( 189      ( 04 Aug 2019 04:45 )             29.1     08-04    142  |  112<H>  |  10  ----------------------------<  99  3.2<L>   |  19<L>  |  0.59    Ca    7.9<L>      04 Aug 2019 04:45  Phos  3.9     08-04  Mg     2.0     08-04              Consultant(s) Notes Reviewed:  Yes  Care Discussed with Consultants/Other Providers:

## 2019-08-04 NOTE — PROGRESS NOTE ADULT - ASSESSMENT
Ms. Lovell is a 57 yo woman with medical hx notable for HTN, active cigarette use, alcohol abuse disorder admitted for acute on chronic anemia 2/2 bleeding gastric body ulcer s/p transfusion of 5 units pRBC and EGD

## 2019-08-05 DIAGNOSIS — F10.10 ALCOHOL ABUSE, UNCOMPLICATED: ICD-10-CM

## 2019-08-05 DIAGNOSIS — D64.9 ANEMIA, UNSPECIFIED: ICD-10-CM

## 2019-08-05 DIAGNOSIS — I10 ESSENTIAL (PRIMARY) HYPERTENSION: ICD-10-CM

## 2019-08-05 LAB
ANION GAP SERPL CALC-SCNC: 10 MMO/L — SIGNIFICANT CHANGE UP (ref 7–14)
BUN SERPL-MCNC: 7 MG/DL — SIGNIFICANT CHANGE UP (ref 7–23)
CALCIUM SERPL-MCNC: 7.7 MG/DL — LOW (ref 8.4–10.5)
CHLORIDE SERPL-SCNC: 111 MMOL/L — HIGH (ref 98–107)
CO2 SERPL-SCNC: 19 MMOL/L — LOW (ref 22–31)
CREAT SERPL-MCNC: 0.53 MG/DL — SIGNIFICANT CHANGE UP (ref 0.5–1.3)
GLUCOSE BLDC GLUCOMTR-MCNC: 80 MG/DL — SIGNIFICANT CHANGE UP (ref 70–99)
GLUCOSE SERPL-MCNC: 77 MG/DL — SIGNIFICANT CHANGE UP (ref 70–99)
HCT VFR BLD CALC: 27.5 % — LOW (ref 34.5–45)
HGB BLD-MCNC: 9.3 G/DL — LOW (ref 11.5–15.5)
MAGNESIUM SERPL-MCNC: 2 MG/DL — SIGNIFICANT CHANGE UP (ref 1.6–2.6)
MCHC RBC-ENTMCNC: 30.7 PG — SIGNIFICANT CHANGE UP (ref 27–34)
MCHC RBC-ENTMCNC: 33.8 % — SIGNIFICANT CHANGE UP (ref 32–36)
MCV RBC AUTO: 90.8 FL — SIGNIFICANT CHANGE UP (ref 80–100)
NRBC # FLD: 0 K/UL — SIGNIFICANT CHANGE UP (ref 0–0)
PHOSPHATE SERPL-MCNC: 3.3 MG/DL — SIGNIFICANT CHANGE UP (ref 2.5–4.5)
PLATELET # BLD AUTO: 212 K/UL — SIGNIFICANT CHANGE UP (ref 150–400)
PMV BLD: 9.5 FL — SIGNIFICANT CHANGE UP (ref 7–13)
POTASSIUM SERPL-MCNC: 3.4 MMOL/L — LOW (ref 3.5–5.3)
POTASSIUM SERPL-SCNC: 3.4 MMOL/L — LOW (ref 3.5–5.3)
RBC # BLD: 3.03 M/UL — LOW (ref 3.8–5.2)
RBC # FLD: 19.1 % — HIGH (ref 10.3–14.5)
SODIUM SERPL-SCNC: 140 MMOL/L — SIGNIFICANT CHANGE UP (ref 135–145)
WBC # BLD: 8.18 K/UL — SIGNIFICANT CHANGE UP (ref 3.8–10.5)
WBC # FLD AUTO: 8.18 K/UL — SIGNIFICANT CHANGE UP (ref 3.8–10.5)

## 2019-08-05 PROCEDURE — 99233 SBSQ HOSP IP/OBS HIGH 50: CPT

## 2019-08-05 RX ORDER — POTASSIUM CHLORIDE 20 MEQ
40 PACKET (EA) ORAL ONCE
Refills: 0 | Status: COMPLETED | OUTPATIENT
Start: 2019-08-05 | End: 2019-08-05

## 2019-08-05 RX ADMIN — Medication 1 PATCH: at 12:02

## 2019-08-05 RX ADMIN — Medication 650 MILLIGRAM(S): at 14:23

## 2019-08-05 RX ADMIN — Medication 650 MILLIGRAM(S): at 20:38

## 2019-08-05 RX ADMIN — Medication 650 MILLIGRAM(S): at 04:48

## 2019-08-05 RX ADMIN — PANTOPRAZOLE SODIUM 40 MILLIGRAM(S): 20 TABLET, DELAYED RELEASE ORAL at 05:29

## 2019-08-05 RX ADMIN — PANTOPRAZOLE SODIUM 40 MILLIGRAM(S): 20 TABLET, DELAYED RELEASE ORAL at 17:11

## 2019-08-05 RX ADMIN — CHLORHEXIDINE GLUCONATE 1 APPLICATION(S): 213 SOLUTION TOPICAL at 12:02

## 2019-08-05 RX ADMIN — Medication 650 MILLIGRAM(S): at 15:20

## 2019-08-05 RX ADMIN — Medication 1 PATCH: at 07:17

## 2019-08-05 RX ADMIN — Medication 2 MILLIGRAM(S): at 00:31

## 2019-08-05 RX ADMIN — Medication 1 MILLIGRAM(S): at 12:02

## 2019-08-05 RX ADMIN — Medication 650 MILLIGRAM(S): at 06:00

## 2019-08-05 RX ADMIN — Medication 1 PATCH: at 11:59

## 2019-08-05 RX ADMIN — Medication 1 PATCH: at 20:42

## 2019-08-05 RX ADMIN — Medication 650 MILLIGRAM(S): at 21:00

## 2019-08-05 RX ADMIN — Medication 1 TABLET(S): at 12:02

## 2019-08-05 RX ADMIN — Medication 40 MILLIEQUIVALENT(S): at 08:56

## 2019-08-05 NOTE — PROGRESS NOTE ADULT - SUBJECTIVE AND OBJECTIVE BOX
Patient is a 58y old  Female who presents with a chief complaint of Acute on chronic anemia 2/2 bleeding gastric body ulcer (04 Aug 2019 10:19)      SUBJECTIVE / OVERNIGHT EVENTS:    No acute event.  no new complaint. No new GIB     Review of Systems:    RESPIRATORY: No cough, wheezing, chills or hemoptysis; No shortness of breath  CARDIOVASCULAR: No chest pain, palpitations, dizziness, or leg swelling  GASTROINTESTINAL: No abdominal or epigastric pain. No nausea, vomiting, or hematemesis; No diarrhea or constipation. No melena or hematochezia.        MEDICATIONS  (STANDING):  chlorhexidine 4% Liquid 1 Application(s) Topical daily  folic acid 1 milliGRAM(s) Oral daily  multivitamin 1 Tablet(s) Oral daily  nicotine - 21 mG/24Hr(s) Patch 1 patch Transdermal daily  pantoprazole  Injectable 40 milliGRAM(s) IV Push two times a day  sodium chloride 0.9%. 1000 milliLiter(s) (75 mL/Hr) IV Continuous <Continuous>    MEDICATIONS  (PRN):  acetaminophen   Tablet .. 650 milliGRAM(s) Oral every 6 hours PRN Mild Pain (1 - 3)  LORazepam   Injectable 2 milliGRAM(s) IV Push every 2 hours PRN CIWA-Ar score increase by 2 points and a total score of 7 or less  LORazepam   Injectable 2 milliGRAM(s) IV Push every 1 hour PRN CIWA-Ar score 8 or greater      PHYSICAL EXAM:  T(C): 36.9 (08-05-19 @ 16:16), Max: 37.2 (08-04-19 @ 20:30)  HR: 92 (08-05-19 @ 16:16) (87 - 99)  BP: 132/80 (08-05-19 @ 16:16) (129/79 - 140/86)  RR: 18 (08-05-19 @ 16:16) (18 - 18)  SpO2: 100% (08-05-19 @ 16:16) (98% - 100%)  I&O's Summary    04 Aug 2019 07:01  -  05 Aug 2019 07:00  --------------------------------------------------------  IN: 300 mL / OUT: 1100 mL / NET: -800 mL    05 Aug 2019 07:01  -  05 Aug 2019 16:19  --------------------------------------------------------  IN: 0 mL / OUT: 400 mL / NET: -400 mL      GENERAL: middle age female lying in bed in NAD   MENTAL STATUS/PSYCH:  AAO x2-3   HEAD:  Atraumatic, Normocephalic  EYES: EOMI, PERRLA, conjunctiva and sclera clear  NECK: Supple, No elevated JVD  CHEST/LUNG: Clear to auscultation bilaterally; No wheeze  HEART: Regular rate and rhythm; No murmurs, rubs, or gallops  ABDOMEN: Soft, Nontender, Nondistended; Bowel sounds present  EXTREMITIES:  2+ Peripheral Pulses, No clubbing, cyanosis, or edema  NEUROLOGY: CN II-XII grossly intact, moving all extremities  SKIN: No rashes or lesions    LABS:  CAPILLARY BLOOD GLUCOSE      POCT Blood Glucose.: 80 mg/dL (05 Aug 2019 08:41)                          9.3    8.18  )-----------( 212      ( 05 Aug 2019 06:27 )             27.5     08-05    140  |  111<H>  |  7   ----------------------------<  77  3.4<L>   |  19<L>  |  0.53    Ca    7.7<L>      05 Aug 2019 06:27  Phos  3.3     08-05  Mg     2.0     08-05

## 2019-08-05 NOTE — SBIRT NOTE ADULT - NSSBIRTALCPASSREFTXDET_GEN_A_CORE
SW met with patient at bedside to complete SBIRT assessment. Patient is [place] speaking with family at bedside. SW requested permission to speak in front of family members and patient verbalized "yes". Patient is currently living with daughter and has social support in the home. Patient's daughter Lynda is currently working with the [facility]'s Medicaid Unit on a medicaid application for patient. Patient currently has no medical coverage and was in agreement for [facility] crisis center information,inpatient and outpatient treatment program and alcohol treatment information. SW will remain available and continue to follow.

## 2019-08-05 NOTE — PROGRESS NOTE ADULT - PROBLEM SELECTOR PLAN 3
- encourage alcohol cessation  - continue symptom triggered CIWA protocol   - continue folic acid and multivitamin
- encourage alcohol cessation  - continue symptom triggered CIWA protocol   - continue folic acid and multivitamin

## 2019-08-05 NOTE — PROGRESS NOTE ADULT - REASON FOR ADMISSION
dizziness, GI Bleed ,
dizziness, GI Bleed ,
Acute on chronic anemia 2/2 bleeding gastric body ulcer
dizziness, GI Bleed ,

## 2019-08-05 NOTE — PROGRESS NOTE ADULT - PROBLEM SELECTOR PLAN 2
- continue to hold home regimen of antihypertensive meds
- continue to hold home regimen of antihypertensive meds

## 2019-08-05 NOTE — PROGRESS NOTE ADULT - PROBLEM SELECTOR PLAN 1
Due to acute blood loss from bleeding gastric ulcer. S/p 5 units pRBC, 1U plasma, 1U platelets. S/p EGD on 8/2 with gastric ulcer with adherent clot, treated with apc and epi, unable to fully remove clot Hb/Hct now stable.   - monitor blood counts. Transfuse for Hb<7. Maintain active T&S  - last day of PPI gtt today --> transition to pantoprazole 40mg IV BID  - advance diet to clear liquids today  - monitor VS  - remove rectal tube.  - avoid NSAIDs, encourage alcohol cessation
Due to acute blood loss from bleeding gastric ulcer. S/p 5 units pRBC, 1U plasma, 1U platelets. S/p EGD on 8/2 with gastric ulcer with adherent clot, treated with apc and epi, unable to fully remove clot Hb/Hct now stable.   - monitor blood counts. Transfuse for Hb<7. Maintain active T&S  - last day of PPI gtt today --> transition to pantoprazole 40mg IV BID  - advance diet as tolerated   - monitor VS  - avoid NSAIDs, encourage alcohol cessation

## 2019-08-05 NOTE — PROGRESS NOTE ADULT - PROBLEM SELECTOR PLAN 4
TOV today
Unclear etiology for Cannon catheter. Will clarify indication. If no clear indication, will remove and attempt TOV.

## 2019-08-06 ENCOUNTER — TRANSCRIPTION ENCOUNTER (OUTPATIENT)
Age: 59
End: 2019-08-06

## 2019-08-06 VITALS
SYSTOLIC BLOOD PRESSURE: 129 MMHG | DIASTOLIC BLOOD PRESSURE: 85 MMHG | TEMPERATURE: 98 F | OXYGEN SATURATION: 99 % | RESPIRATION RATE: 18 BRPM | HEART RATE: 73 BPM

## 2019-08-06 LAB
ANION GAP SERPL CALC-SCNC: 10 MMO/L — SIGNIFICANT CHANGE UP (ref 7–14)
BLD GP AB SCN SERPL QL: NEGATIVE — SIGNIFICANT CHANGE UP
BUN SERPL-MCNC: 8 MG/DL — SIGNIFICANT CHANGE UP (ref 7–23)
CALCIUM SERPL-MCNC: 8.2 MG/DL — LOW (ref 8.4–10.5)
CHLORIDE SERPL-SCNC: 108 MMOL/L — HIGH (ref 98–107)
CO2 SERPL-SCNC: 23 MMOL/L — SIGNIFICANT CHANGE UP (ref 22–31)
CREAT SERPL-MCNC: 0.56 MG/DL — SIGNIFICANT CHANGE UP (ref 0.5–1.3)
GLUCOSE SERPL-MCNC: 96 MG/DL — SIGNIFICANT CHANGE UP (ref 70–99)
HCT VFR BLD CALC: 27.4 % — LOW (ref 34.5–45)
HGB BLD-MCNC: 9.1 G/DL — LOW (ref 11.5–15.5)
MAGNESIUM SERPL-MCNC: 1.9 MG/DL — SIGNIFICANT CHANGE UP (ref 1.6–2.6)
MCHC RBC-ENTMCNC: 29.9 PG — SIGNIFICANT CHANGE UP (ref 27–34)
MCHC RBC-ENTMCNC: 33.2 % — SIGNIFICANT CHANGE UP (ref 32–36)
MCV RBC AUTO: 90.1 FL — SIGNIFICANT CHANGE UP (ref 80–100)
NRBC # FLD: 0 K/UL — SIGNIFICANT CHANGE UP (ref 0–0)
PHOSPHATE SERPL-MCNC: 3 MG/DL — SIGNIFICANT CHANGE UP (ref 2.5–4.5)
PLATELET # BLD AUTO: 248 K/UL — SIGNIFICANT CHANGE UP (ref 150–400)
PMV BLD: 9.2 FL — SIGNIFICANT CHANGE UP (ref 7–13)
POTASSIUM SERPL-MCNC: 3.8 MMOL/L — SIGNIFICANT CHANGE UP (ref 3.5–5.3)
POTASSIUM SERPL-SCNC: 3.8 MMOL/L — SIGNIFICANT CHANGE UP (ref 3.5–5.3)
RBC # BLD: 3.04 M/UL — LOW (ref 3.8–5.2)
RBC # FLD: 18.6 % — HIGH (ref 10.3–14.5)
RH IG SCN BLD-IMP: POSITIVE — SIGNIFICANT CHANGE UP
SODIUM SERPL-SCNC: 141 MMOL/L — SIGNIFICANT CHANGE UP (ref 135–145)
WBC # BLD: 6.29 K/UL — SIGNIFICANT CHANGE UP (ref 3.8–10.5)
WBC # FLD AUTO: 6.29 K/UL — SIGNIFICANT CHANGE UP (ref 3.8–10.5)

## 2019-08-06 PROCEDURE — 99239 HOSP IP/OBS DSCHRG MGMT >30: CPT

## 2019-08-06 RX ORDER — LISINOPRIL 2.5 MG/1
1 TABLET ORAL
Qty: 30 | Refills: 0
Start: 2019-08-06 | End: 2019-09-04

## 2019-08-06 RX ORDER — PANTOPRAZOLE SODIUM 20 MG/1
1 TABLET, DELAYED RELEASE ORAL
Qty: 60 | Refills: 0
Start: 2019-08-06 | End: 2019-09-04

## 2019-08-06 RX ORDER — LISINOPRIL 2.5 MG/1
1 TABLET ORAL
Qty: 0 | Refills: 0 | DISCHARGE

## 2019-08-06 RX ORDER — FOLIC ACID 0.8 MG
1 TABLET ORAL
Qty: 30 | Refills: 0
Start: 2019-08-06 | End: 2019-09-04

## 2019-08-06 RX ORDER — ATENOLOL 25 MG/1
1 TABLET ORAL
Qty: 30 | Refills: 0
Start: 2019-08-06 | End: 2019-09-04

## 2019-08-06 RX ORDER — ATENOLOL 25 MG/1
1 TABLET ORAL
Qty: 0 | Refills: 0 | DISCHARGE

## 2019-08-06 RX ADMIN — PANTOPRAZOLE SODIUM 40 MILLIGRAM(S): 20 TABLET, DELAYED RELEASE ORAL at 05:18

## 2019-08-06 RX ADMIN — Medication 1 MILLIGRAM(S): at 13:25

## 2019-08-06 RX ADMIN — Medication 1 PATCH: at 06:51

## 2019-08-06 RX ADMIN — Medication 1 TABLET(S): at 13:25

## 2019-08-06 RX ADMIN — CHLORHEXIDINE GLUCONATE 1 APPLICATION(S): 213 SOLUTION TOPICAL at 13:25

## 2019-08-06 RX ADMIN — Medication 1 PATCH: at 13:26

## 2019-08-06 RX ADMIN — Medication 1 PATCH: at 13:25

## 2019-08-06 RX ADMIN — Medication 100 MILLIGRAM(S): at 08:25

## 2019-08-06 RX ADMIN — Medication 100 MILLIGRAM(S): at 00:26

## 2019-08-06 NOTE — DISCHARGE NOTE NURSING/CASE MANAGEMENT/SOCIAL WORK - NSDCDPATPORTLINK_GEN_ALL_CORE
You can access the [email or web address], offered by [facility], by registering with the following website: [email or web address]

## 2019-08-06 NOTE — DISCHARGE NOTE PROVIDER - CARE PROVIDER_API CALL
Dharmesh Yates)  Gastroenterology; Internal Medicine  [address], [address]  [place] [id number]  Phone: [phone]  Fax: [phone]  Follow Up Time:

## 2019-08-06 NOTE — DISCHARGE NOTE PROVIDER - HOSPITAL COURSE
58F w/ heavy ETOH use, daily smoker, HTN, and anxiety who presents to the ED w/ dizziness and nausea x 1 week, found to have Hgb 5.9 w/ hematemesis and hematochezia w/ CTA showing active bleeding from pseudoaneurysm arising from anterior gastric body, admitted for mgmt of acute anemia 2/2 UGIB.        Hospital Course:        MICU COURSE:    58 y.o Korean speaking female with a PMHx of HTN, current smoker and chronic drinker (6 beers/day for many years) presented to the ED for dizziness and vomiting, found to have hematemesis and melena in ED with Hgb of 5.9 2/2 GIB. CTA with active bleeding in stomach and pseudoaneurysm. GI consulted - patient on PPI drip. Patient was intubated for airway protection for EGD. S/p EGD on 8/2 with gastric ulcer with adherent clot - treated with epinephrin and APC. Clot unable to be completely removed. S/p 5U PRBCs, 1U plasma, 1U platelets. H/H now stable. Patient was extubated on 8/3, tolerated well. Trended CBCs. Placed on PPI gtt for 72 hours post-EGD.        Transferred to Medicine on 8/3:        SIRS (systemic inflammatory response syndrome).    -p/w tachycardia to 120s, leukocytosis to 14    -suspect 2/2 acute GIB and multiple episodes of vomiting    -IVF resuscitation.     -Resolved        Upper GI bleed.    -p/w symptomatic anemia on admission, Hgb 5.9, found to have hematemesis and hematochezia c/w brisk upper GIB and melena    -CTA demonstrating active bleeding from pseudoaneurysm of gastric antrum    -received octreotide 50IV x1    -s/p 80IV pantoprazole, placed on PPI gtt x72 hrs    -IR consulted- recommended GI endoscopic evaluation prior to invasive procedure such as embolization     -s/p MICU admission for elective intubation for endoscopy by GI     -per Surgical evaluation, no surgical  interventions     -s/p 5 units pRBC, 1U plasma, 1U platelets    -EGD      - Non-bleeding gastric ulcer with adherent clot in the                          proximal gastric body. Injected with epinephrine and                          treated with bipolar cautery.                         - No specimens collected.                         - Continue PPI gtt for 72 hours.                         - Monitor CBC and transfuse for Hb<7.                         - Check H.pylori serology and treat if positive.    - continued protonix gtt x72 hrs (until 8/5), H/H stable    - Rectal tube removed on 8/4    - pantoprazole 40mg IV BID    - will need repeat EGD in 8 weeks for ulcer surveillance        Anemia due to blood loss.    - p/w normocytic anemia w/ Hgb 5.9 likely 2/2 acute GIB    -now s/p 5u pRBC given active ongoing bleed w/ initial minimal response    -maintain active T+S, goal Hgb >7.0    -GI following    -Iron studies: Iron 187 , Ferritin 45.07, TIBC 187, Capacity <10, B12 255, Folate 10.0    -H/H stable at time of D/C        Essential hypertension.    -on atenolol and lisinopril at home    -held in the s/o acute GIB    -continued to monitor BPs.        Urinary Retention.    -Smith placed on 8/2 for procedure in MICU    -Bladder scan on 8/3 showed 900cc --> smith replaced on 8/3    -TOV passed on 8/6, no further indication for smith         Anxiety.    -pt states she was started on a medication for anxiety by her PCP, thought it was her atenolol     -ISTOP neg for anxiety meds including benzos        ETOH abuse.    -drinks 5-6 beers/day    -no prior hx w/d; however, pt states she has never stopped drinking    -symptom triggered CIWA         Tobacco use disorder.     -has been smoking 10cigs/day >40yrs    -nicotine patch.         DVT ppx.    -SCDs given active bleed        On 8/6 this case was reviewed with Dr. Vigil, the patient is medically stable and optimized for discharge. Medications were reviewed and all medications were sent to a mutually agreed upon pharmacy. 58F w/ heavy ETOH use, daily smoker, HTN, and anxiety who presents to the ED w/ dizziness and nausea x 1 week, found to have Hgb 5.9 w/ hematemesis and hematochezia w/ CTA showing active bleeding from pseudoaneurysm arising from anterior gastric body, admitted for mgmt of acute anemia 2/2 UGIB.        Hospital Course:        MICU COURSE:    58 y.o Polish speaking female with a PMHx of HTN, current smoker and chronic drinker (6 beers/day for many years) presented to the ED for dizziness and vomiting, found to have hematemesis and melena in ED with Hgb of 5.9 2/2 GIB. CTA with active bleeding in stomach and pseudoaneurysm. GI consulted - patient on PPI drip. Patient was intubated for airway protection for EGD. S/p EGD on 8/2 with gastric ulcer with adherent clot - treated with epinephrin and APC. Clot unable to be completely removed. S/p 5U PRBCs, 1U plasma, 1U platelets. H/H now stable. Patient was extubated on 8/3, tolerated well. Trended CBCs. Placed on PPI gtt for 72 hours post-EGD.        Transferred to Medicine on 8/3:        SIRS (systemic inflammatory response syndrome).    -p/w tachycardia to 120s, leukocytosis to 14    -suspect 2/2 acute GIB and multiple episodes of vomiting    -IVF resuscitation.     -Resolved        Upper GI bleed.    -p/w symptomatic anemia on admission, Hgb 5.9, found to have hematemesis and hematochezia c/w brisk upper GIB and melena    -CTA demonstrating active bleeding from pseudoaneurysm of gastric antrum    -received octreotide 50IV x1    -s/p 80IV pantoprazole, placed on PPI gtt x72 hrs    -IR consulted- recommended GI endoscopic evaluation prior to invasive procedure such as embolization     -s/p MICU admission for elective intubation for endoscopy by GI     -per Surgical evaluation, no surgical  interventions     -s/p 5 units pRBC, 1U plasma, 1U platelets    -EGD      - Non-bleeding gastric ulcer with adherent clot in the                          proximal gastric body. Injected with epinephrine and                          treated with bipolar cautery.                         - No specimens collected.                         - Continue PPI gtt for 72 hours.                         - Monitor CBC and transfuse for Hb<7.                         - Check H.pylori serology and treat if positive.    - continued protonix gtt x72 hrs (until 8/5), H/H stable    - Rectal tube removed on 8/4    - pantoprazole 40mg IV BID    - will need repeat EGD in 8 weeks for ulcer surveillance        Anemia due to blood loss.    - p/w normocytic anemia w/ Hgb 5.9 likely 2/2 acute GIB    -now s/p 5u pRBC given active ongoing bleed w/ initial minimal response    -maintain active T+S, goal Hgb >7.0    -GI following    -Iron studies: Iron 187 , Ferritin 45.07, TIBC 187, Capacity <10, B12 255, Folate 10.0    -H/H stable at time of D/C        Essential hypertension.    -on atenolol and lisinopril at home    -held in the s/o acute GIB    -continued to monitor BPs.        Urinary Retention.    -Smith placed on 8/2 for procedure in MICU    -Bladder scan on 8/3 showed 900cc --> smith replaced on 8/3    -TOV passed on 8/6, no further indication for smith         Anxiety.    -pt states she was started on a medication for anxiety by her PCP, thought it was her atenolol     -ISTOP neg for anxiety meds including benzos        ETOH abuse.    -drinks 5-6 beers/day    -no prior hx w/d; however, pt states she has never stopped drinking    -symptom triggered CIWA         Tobacco use disorder.     -has been smoking 10cigs/day >40yrs    -nicotine patch.         DVT ppx.    -SCDs given active bleed        On 8/6 this case was reviewed with Dr. Vigil, the patient is medically stable and optimized for discharge. Medications were reviewed and all medications were sent to a mutually agreed upon pharmacy. 58F w/ heavy ETOH use, daily smoker, HTN, and anxiety who presents to the ED w/ dizziness and nausea x 1 week, found to have Hgb 5.9 w/ hematemesis and hematochezia w/ CTA showing active bleeding from pseudoaneurysm arising from anterior gastric body, admitted for mgmt of acute anemia 2/2 UGIB.        Hospital Course:        MICU COURSE:    58 y.o [place] speaking female with a PMHx of HTN, current smoker and chronic drinker (6 beers/day for many years) presented to the ED for dizziness and vomiting, found to have hematemesis and melena in ED with Hgb of 5.9 2/2 GIB. CTA with active bleeding in stomach and pseudoaneurysm. GI consulted - patient on PPI drip. Patient was intubated for airway protection for EGD. S/p EGD on 8/2 with gastric ulcer with adherent clot - treated with epinephrin and APC. Clot unable to be completely removed. S/p 5U PRBCs, 1U plasma, 1U platelets. H/H now stable. Patient was extubated on 8/3, tolerated well. Trended CBCs. Placed on PPI gtt for 72 hours post-EGD.        Transferred to Medicine on 8/3:        SIRS (systemic inflammatory response syndrome).    -p/w tachycardia to 120s, leukocytosis to 14    -suspect 2/2 acute GIB and multiple episodes of vomiting    -IVF resuscitation.     -Resolved        Upper GI bleed.    -p/w symptomatic anemia on admission, Hgb 5.9, found to have hematemesis and hematochezia c/w brisk upper GIB and melena    -CTA demonstrating active bleeding from pseudoaneurysm of gastric antrum    -received octreotide 50IV x1    -s/p 80IV pantoprazole, placed on PPI gtt x72 hrs    -IR consulted- recommended GI endoscopic evaluation prior to invasive procedure such as embolization     -s/p MICU admission for elective intubation for endoscopy by GI     -per Surgical evaluation, no surgical  interventions     -s/p 5 units pRBC, 1U plasma, 1U platelets    -EGD      - Non-bleeding gastric ulcer with adherent clot in the                          proximal gastric body. Injected with epinephrine and                          treated with bipolar cautery.                         - No specimens collected.                         - Continue PPI gtt for 72 hours.                         - Monitor CBC and transfuse for Hb<7.                         - Check H.pylori serology and treat if positive.    - continued protonix gtt x72 hrs (until 8/5), H/H stable    - Rectal tube removed on 8/4    - pantoprazole 40mg BID    - will need repeat EGD in 8 weeks for ulcer surveillance        Anemia due to blood loss.    - p/w normocytic anemia w/ Hgb 5.9 likely 2/2 acute GIB    -now s/p 5u pRBC given active ongoing bleed w/ initial minimal response    -maintain active T+S, goal Hgb >7.0    -GI following    -Iron studies: Iron 187 , Ferritin 45.07, TIBC 187, Capacity <10, B12 255, Folate 10.0    -H/H stable at time of D/C        Essential hypertension.    -on atenolol and lisinopril at home    -held in the s/o acute GIB    -continued to monitor BPs.        Urinary Retention.    -Smith placed on 8/2 for procedure in MICU    -Bladder scan on 8/3 showed 900cc --> smith replaced on 8/3    -TOV passed on 8/6, no further indication for smith         Anxiety.    -pt states she was started on a medication for anxiety by her PCP, thought it was her atenolol     -ISTOP neg for anxiety meds including benzos        ETOH abuse.    -drinks 5-6 beers/day    -no prior hx w/d; however, pt states she has never stopped drinking    -symptom triggered CIWA         Tobacco use disorder.     -has been smoking 10cigs/day >40yrs    -nicotine patch.         DVT ppx.    -SCDs given active bleed        On 8/6 this case was reviewed with Dr. Vigil, the patient is medically stable and optimized for discharge. Medications were reviewed and all medications were sent to a mutually agreed upon pharmacy.

## 2019-08-06 NOTE — DISCHARGE NOTE NURSING/CASE MANAGEMENT/SOCIAL WORK - NSDCFUADDAPPT_GEN_ALL_CORE_FT
**Please make and appointment with your primary care provider within 1 - 2 weeks for continued management.    **Please make an appointment with Dr. Yates (Gastroenterologist) within 1 - 2 weeks for continued management and recommendations. Call [phone] to make an appointment. Please arrange for a repeat endoscopy to evaluate your ulcer in 8 weeks.

## 2019-08-06 NOTE — DISCHARGE NOTE PROVIDER - NSDCFUADDAPPT_GEN_ALL_CORE_FT
**Please make and appointment with your primary care provider within 1 - 2 weeks for continued management.    **Please make an appointment with Dr. Yates (Gastroenterologist) within 1 - 2 weeks for continued management and recommendations. Call [phone] to make an appointment. Please arrange for a repeat endoscopy to evaluate your ulcer in 8 weeks.

## 2019-08-06 NOTE — DISCHARGE NOTE PROVIDER - NSFOLLOWUPCLINICS_GEN_ALL_ED_FT
[facility] General Internal Medicine  General Internal Medicine  [address] [id number]  Phone: [phone]  Fax:   Follow Up Time:

## 2019-08-06 NOTE — DISCHARGE NOTE PROVIDER - NSDCCPCAREPLAN_GEN_ALL_CORE_FT
PRINCIPAL DISCHARGE DIAGNOSIS  Diagnosis: Upper GI bleed  Assessment and Plan of Treatment: You were foudn to have a bleed in your stomach. You were treated appropriately and you were given 5 units of blood. Your blood count remains stable and your symptoms appear to be controlled. Please follow-up as an outpatient with your primary care provider for further care and recommendations. PRINCIPAL DISCHARGE DIAGNOSIS  Diagnosis: Upper GI bleed  Assessment and Plan of Treatment: You were foudn to have a bleed in your stomach. You were treated appropriately and you were given 5 units of blood. Your blood count remains stable and your symptoms appear to be controlled.   **Please follow-up as an outpatient with your primary care provider and gastroenterologist for further care and recommendations within 1 - 2 weeks of discharge.  **Please arrange for a repeat endoscopy to evaluate your ulcer in 8 weeks.      SECONDARY DISCHARGE DIAGNOSES  Diagnosis: Acute on chronic anemia  Assessment and Plan of Treatment: You were given 5 units of blood while in the hospital and your hemoglobin levels remain stable. Please follow up with your primary care provider within 1 week for further management and recommendations. Monitor for signs/symptoms indicating worsening of disease, such as, easy bleeding/bruising, pale skin, fatigue, dizziness, increased heart rate, or chest pain.    Diagnosis: Alcohol abuse  Assessment and Plan of Treatment: Please refrain from alcohol as much as possible to optimize your health and prevent adverse events. Return to the ED if you develop any of the following: Shaking/tremors, confusion, irritability, difficulty staying awake/sleeping, fevers, hallucinations, or seizures. Continue taking your daily multivitamins as directed and follow up with your PCP or psychiatrist for further evaluation and medical management. If you need immediate assistance with substance abuse you may contact the [facility] by calling [phone] open 9am-7pm.    Diagnosis: Urinary retention  Assessment and Plan of Treatment: During your admission you had an episode of urinary retention. A smith catheter was placed and your symptoms resolved. Should you experience any difficulty voiding please contact your primary care provider for an appointment as soon as possible.

## 2019-08-07 ENCOUNTER — INBOUND DOCUMENT (OUTPATIENT)
Age: 59
End: 2019-08-07

## 2019-08-07 PROBLEM — F41.9 ANXIETY DISORDER, UNSPECIFIED: Chronic | Status: ACTIVE | Noted: 2019-08-02

## 2019-08-07 PROBLEM — F10.10 ALCOHOL ABUSE, UNCOMPLICATED: Chronic | Status: ACTIVE | Noted: 2019-08-02

## 2019-08-07 PROBLEM — I10 ESSENTIAL (PRIMARY) HYPERTENSION: Chronic | Status: ACTIVE | Noted: 2019-08-02

## 2019-08-12 ENCOUNTER — APPOINTMENT (OUTPATIENT)
Dept: INTERNAL MEDICINE | Facility: CLINIC | Age: 59
End: 2019-08-12

## 2019-08-12 ENCOUNTER — LABORATORY RESULT (OUTPATIENT)
Age: 59
End: 2019-08-12

## 2019-08-12 ENCOUNTER — OUTPATIENT (OUTPATIENT)
Dept: OUTPATIENT SERVICES | Facility: HOSPITAL | Age: 59
LOS: 1 days | End: 2019-08-12

## 2019-08-12 VITALS
OXYGEN SATURATION: 99 % | BODY MASS INDEX: 21.62 KG/M2 | SYSTOLIC BLOOD PRESSURE: 130 MMHG | HEART RATE: 60 BPM | DIASTOLIC BLOOD PRESSURE: 76 MMHG | HEIGHT: 61 IN | WEIGHT: 114.5 LBS

## 2019-08-12 DIAGNOSIS — D62 ACUTE POSTHEMORRHAGIC ANEMIA: ICD-10-CM

## 2019-08-12 DIAGNOSIS — F10.10 ALCOHOL ABUSE, UNCOMPLICATED: ICD-10-CM

## 2019-08-12 DIAGNOSIS — Z98.890 OTHER SPECIFIED POSTPROCEDURAL STATES: Chronic | ICD-10-CM

## 2019-08-12 DIAGNOSIS — Z82.49 FAMILY HISTORY OF ISCHEMIC HEART DISEASE AND OTHER DISEASES OF THE CIRCULATORY SYSTEM: ICD-10-CM

## 2019-08-12 DIAGNOSIS — I10 ESSENTIAL (PRIMARY) HYPERTENSION: ICD-10-CM

## 2019-08-12 DIAGNOSIS — Z87.891 PERSONAL HISTORY OF NICOTINE DEPENDENCE: ICD-10-CM

## 2019-08-12 DIAGNOSIS — K92.2 GASTROINTESTINAL HEMORRHAGE, UNSPECIFIED: ICD-10-CM

## 2019-08-12 RX ORDER — MULTIVITAMIN
CAPSULE ORAL DAILY
Qty: 30 | Refills: 3 | Status: ACTIVE | COMMUNITY
Start: 2019-08-12

## 2019-08-12 NOTE — REVIEW OF SYSTEMS
[Fatigue] : fatigue [Headache] : headache [Fever] : no fever [Chills] : no chills [Palpitations] : no palpitations [Chest Pain] : no chest pain [Lower Ext Edema] : no lower extremity edema [Shortness Of Breath] : no shortness of breath [Cough] : no cough [Dyspnea on Exertion] : no dyspnea on exertion [Abdominal Pain] : no abdominal pain [Nausea] : no nausea [Constipation] : no constipation [Diarrhea] : diarrhea [Vomiting] : no vomiting [Melena] : no melena [Heartburn] : no heartburn [Dysuria] : no dysuria [Incontinence] : no incontinence [Hematuria] : no hematuria [Frequency] : no frequency [Fainting] : no fainting [Dizziness] : no dizziness [Memory Loss] : no memory loss [Confusion] : no confusion [Unsteady Walking] : no ataxia [Insomnia] : no insomnia [Anxiety] : no anxiety

## 2019-08-12 NOTE — HISTORY OF PRESENT ILLNESS
[Admitted on: ___] : The patient was admitted on [unfilled] [Discharged on ___] : discharged on [unfilled] [Discharge Summary] : discharge summary [Pertinent Labs] : pertinent labs [Radiology Findings] : radiology findings [Discharge Med List] : discharge medication list [Med Reconciliation] : medication reconciliation has been completed [FreeTextEntry2] : JUAN C FARRIS is a 58 year old female with HTN, anxiety here for a hospital follow up. diagnosed with upper GI bleed, in ICU, s/p EGD fr pseudoaneurysm bleed in ant gastric body. Hb 5.9, given 5 units PRBC, 1 unit plasma and 1 unit plts. +heavy etoh use prior to admission. \par also had an episode of urinary retention requiring smith cath. improved upon discharge. \par post discharge: has been feeling well. compliant with medications. has been off etoh. \par \par Accompanied and translated by daughter.

## 2019-08-12 NOTE — HEALTH RISK ASSESSMENT
[6-10] : 6-10 [No] : No [1] : 2) Feeling down, depressed, or hopeless for several days (1) [] : No [YearQuit] : 2019 [MHP2Hjioj] : 2 [de-identified] : quit 8/2019 [HIVDate] : 0 [HepatitisCDate] : 09/19 [HepatitisCComments] : neg

## 2019-08-13 ENCOUNTER — RESULT REVIEW (OUTPATIENT)
Age: 59
End: 2019-08-13

## 2019-09-01 ENCOUNTER — OUTPATIENT (OUTPATIENT)
Dept: OUTPATIENT SERVICES | Facility: HOSPITAL | Age: 59
LOS: 1 days | End: 2019-09-01
Payer: MEDICAID

## 2019-09-01 DIAGNOSIS — Z98.890 OTHER SPECIFIED POSTPROCEDURAL STATES: Chronic | ICD-10-CM

## 2019-09-01 PROCEDURE — G9001: CPT

## 2019-09-11 DIAGNOSIS — Z71.89 OTHER SPECIFIED COUNSELING: ICD-10-CM

## 2019-09-16 ENCOUNTER — OUTPATIENT (OUTPATIENT)
Dept: OUTPATIENT SERVICES | Facility: HOSPITAL | Age: 59
LOS: 1 days | End: 2019-09-16

## 2019-09-16 ENCOUNTER — APPOINTMENT (OUTPATIENT)
Dept: INTERNAL MEDICINE | Facility: CLINIC | Age: 59
End: 2019-09-16
Payer: MEDICAID

## 2019-09-16 VITALS
WEIGHT: 127.8 LBS | RESPIRATION RATE: 15 BRPM | SYSTOLIC BLOOD PRESSURE: 96 MMHG | DIASTOLIC BLOOD PRESSURE: 58 MMHG | HEIGHT: 61 IN | BODY MASS INDEX: 24.13 KG/M2 | OXYGEN SATURATION: 99 % | HEART RATE: 55 BPM

## 2019-09-16 DIAGNOSIS — Z12.39 ENCOUNTER FOR OTHER SCREENING FOR MALIGNANT NEOPLASM OF BREAST: ICD-10-CM

## 2019-09-16 DIAGNOSIS — Z98.890 OTHER SPECIFIED POSTPROCEDURAL STATES: Chronic | ICD-10-CM

## 2019-09-16 DIAGNOSIS — Z00.00 ENCOUNTER FOR GENERAL ADULT MEDICAL EXAMINATION W/OUT ABNORMAL FINDINGS: ICD-10-CM

## 2019-09-16 PROCEDURE — 99213 OFFICE O/P EST LOW 20 MIN: CPT

## 2019-09-16 NOTE — HISTORY OF PRESENT ILLNESS
[Family Member] : family member [FreeTextEntry1] : follow up for HTN and smoking  [de-identified] : JUAN C FARRIS is a 59 year old female with recent upper GI bleed, HTN, smoking, anemia due to bleed here for f/u on BP and smoking. Pt feels well. Has been complaint with meds. Has upcoming GI appointment for repeat EGD. On PPI. Stopped smoking, on nicotine  patch. \par

## 2019-09-17 DIAGNOSIS — Z87.891 PERSONAL HISTORY OF NICOTINE DEPENDENCE: ICD-10-CM

## 2019-09-17 DIAGNOSIS — I10 ESSENTIAL (PRIMARY) HYPERTENSION: ICD-10-CM

## 2019-09-17 DIAGNOSIS — D62 ACUTE POSTHEMORRHAGIC ANEMIA: ICD-10-CM

## 2019-09-17 DIAGNOSIS — Z12.39 ENCOUNTER FOR OTHER SCREENING FOR MALIGNANT NEOPLASM OF BREAST: ICD-10-CM

## 2019-10-07 ENCOUNTER — RX RENEWAL (OUTPATIENT)
Age: 59
End: 2019-10-07

## 2019-10-07 ENCOUNTER — MESSAGE (OUTPATIENT)
Age: 59
End: 2019-10-07

## 2019-10-07 RX ORDER — NICOTINE 21 MG/24HR
14 PATCH, TRANSDERMAL 24 HOURS TRANSDERMAL DAILY
Qty: 30 | Refills: 0 | Status: DISCONTINUED | COMMUNITY
Start: 2019-09-16 | End: 2019-10-07

## 2019-10-22 ENCOUNTER — RX RENEWAL (OUTPATIENT)
Age: 59
End: 2019-10-22

## 2019-10-24 ENCOUNTER — RX RENEWAL (OUTPATIENT)
Age: 59
End: 2019-10-24

## 2019-10-28 ENCOUNTER — RX RENEWAL (OUTPATIENT)
Age: 59
End: 2019-10-28

## 2019-10-31 ENCOUNTER — APPOINTMENT (OUTPATIENT)
Dept: GASTROENTEROLOGY | Facility: CLINIC | Age: 59
End: 2019-10-31
Payer: MEDICAID

## 2019-10-31 ENCOUNTER — OUTPATIENT (OUTPATIENT)
Dept: OUTPATIENT SERVICES | Facility: HOSPITAL | Age: 59
LOS: 1 days | End: 2019-10-31

## 2019-10-31 VITALS
WEIGHT: 142 LBS | SYSTOLIC BLOOD PRESSURE: 115 MMHG | OXYGEN SATURATION: 97 % | HEIGHT: 61 IN | DIASTOLIC BLOOD PRESSURE: 70 MMHG | BODY MASS INDEX: 26.81 KG/M2 | HEART RATE: 73 BPM

## 2019-10-31 DIAGNOSIS — K25.9 GASTRIC ULCER, UNSPECIFIED AS ACUTE OR CHRONIC, WITHOUT HEMORRHAGE OR PERFORATION: ICD-10-CM

## 2019-10-31 DIAGNOSIS — Z12.11 ENCOUNTER FOR SCREENING FOR MALIGNANT NEOPLASM OF COLON: ICD-10-CM

## 2019-10-31 DIAGNOSIS — F10.10 ALCOHOL ABUSE, UNCOMPLICATED: ICD-10-CM

## 2019-10-31 DIAGNOSIS — Z98.890 OTHER SPECIFIED POSTPROCEDURAL STATES: Chronic | ICD-10-CM

## 2019-10-31 DIAGNOSIS — K92.2 GASTROINTESTINAL HEMORRHAGE, UNSPECIFIED: ICD-10-CM

## 2019-10-31 PROCEDURE — 99213 OFFICE O/P EST LOW 20 MIN: CPT | Mod: GC

## 2019-10-31 NOTE — PHYSICAL EXAM
[General Appearance - Alert] : alert [General Appearance - In No Acute Distress] : in no acute distress [PERRL With Normal Accommodation] : pupils were equal in size, round, and reactive to light [Jugular Venous Distention Increased] : there was no jugular-venous distention [Auscultation Breath Sounds / Voice Sounds] : lungs were clear to auscultation bilaterally [Heart Sounds] : normal S1 and S2 [Bowel Sounds] : normal bowel sounds [Abdomen Soft] : soft [Abdomen Tenderness] : non-tender [] : no hepato-splenomegaly [Abdomen Mass (___ Cm)] : no abdominal mass palpated [No Focal Deficits] : no focal deficits [Oriented To Time, Place, And Person] : oriented to person, place, and time

## 2019-10-31 NOTE — HISTORY OF PRESENT ILLNESS
[Heartburn] : denies heartburn [Nausea] : denies nausea [Vomiting] : denies vomiting [Diarrhea] : denies diarrhea [Constipation] : denies constipation [Yellow Skin Or Eyes (Jaundice)] : denies jaundice [Abdominal Pain] : denies abdominal pain [Abdominal Swelling] : denies abdominal swelling [Rectal Pain] : denies rectal pain [de-identified] : 58 F w/ heavy ETOH use, daily smoker, HTN, and anxiety who presents to our clinic for follow up after her recent admission to the hospital for GI bleed in 08/2019.  \par Hb of 5.9 on admission. s/p 5u PRBC, s/p bedside EGD 08/02 showing proximal gastric body ulcer with adherent clot that could not be removed. Epi injected and clot cauterized with bicap. Has been taking PPI bid until last week she was switched to PPI once a day\par \par Currently denies any complaints.\par Never had colonoscopy\par CT abdomen showed normal liver in 08/2019\par

## 2019-10-31 NOTE — REASON FOR VISIT
[Initial Evaluation] : an initial evaluation [Family Member] : family member [FreeTextEntry1] : post hospitalization follow up

## 2019-10-31 NOTE — ASSESSMENT
[FreeTextEntry1] : 58 F with heavy ETOH use, daily smoker, HTN, and anxiety who presents to our clinic for follow up after her recent admission to the hospital for GI bleed in 08/2019.  s/p EGD showed gastric ulcer.\par She finished 8 weeks therapy of PPI bid. No h/o NSAIDs use\par Never had colonoscopy. No FH of colon CA.\par \par 1. H/o GI bleed / Gastric ulcer \par 2. Colon cancer screening.\par 3. Alcohol abuse\par \par Recommendations:\par - Repeat EGD to confirm ulcer healing, gastric biopsies to rule out h. Pylori \par - Colonoscopy for colon cancer screening\par - No signs of cirrhosis on imaging. Advised to continue avoiding alcohol.\par - Continue on PPI 40 mg once daily \par \par   \par \par \par

## 2019-11-25 RX ORDER — POLYETHYLENE GLYCOL 3350 AND ELECTROLYTES WITH LEMON FLAVOR 236; 22.74; 6.74; 5.86; 2.97 G/4L; G/4L; G/4L; G/4L; G/4L
236 POWDER, FOR SOLUTION ORAL
Qty: 1 | Refills: 0 | Status: DISCONTINUED | COMMUNITY
Start: 2019-10-31 | End: 2019-11-25

## 2019-11-25 RX ORDER — SODIUM CHLORIDE 9 MG/ML
1000 INJECTION, SOLUTION INTRAVENOUS
Refills: 0 | Status: DISCONTINUED | OUTPATIENT
Start: 2019-11-26 | End: 2020-01-09

## 2019-11-26 ENCOUNTER — OTHER (OUTPATIENT)
Age: 59
End: 2019-11-26

## 2019-11-26 ENCOUNTER — RESULT REVIEW (OUTPATIENT)
Age: 59
End: 2019-11-26

## 2019-11-26 ENCOUNTER — OUTPATIENT (OUTPATIENT)
Dept: OUTPATIENT SERVICES | Facility: HOSPITAL | Age: 59
LOS: 1 days | Discharge: HOME | End: 2019-11-26
Payer: MEDICAID

## 2019-11-26 VITALS
HEART RATE: 67 BPM | HEIGHT: 61 IN | OXYGEN SATURATION: 97 % | SYSTOLIC BLOOD PRESSURE: 106 MMHG | WEIGHT: 139.99 LBS | DIASTOLIC BLOOD PRESSURE: 56 MMHG | RESPIRATION RATE: 11 BRPM | TEMPERATURE: 98 F

## 2019-11-26 VITALS
OXYGEN SATURATION: 98 % | WEIGHT: 139.99 LBS | HEART RATE: 58 BPM | DIASTOLIC BLOOD PRESSURE: 56 MMHG | TEMPERATURE: 99 F | SYSTOLIC BLOOD PRESSURE: 106 MMHG | HEIGHT: 61 IN | RESPIRATION RATE: 15 BRPM

## 2019-11-26 DIAGNOSIS — D62 ACUTE POSTHEMORRHAGIC ANEMIA: ICD-10-CM

## 2019-11-26 DIAGNOSIS — Z98.890 OTHER SPECIFIED POSTPROCEDURAL STATES: Chronic | ICD-10-CM

## 2019-11-26 PROCEDURE — 88312 SPECIAL STAINS GROUP 1: CPT | Mod: 26

## 2019-11-26 PROCEDURE — 45385 COLONOSCOPY W/LESION REMOVAL: CPT | Mod: GC

## 2019-11-26 PROCEDURE — 43255 EGD CONTROL BLEEDING ANY: CPT | Mod: GC

## 2019-11-26 PROCEDURE — 88305 TISSUE EXAM BY PATHOLOGIST: CPT | Mod: 26

## 2019-11-26 RX ADMIN — SODIUM CHLORIDE 30 MILLILITER(S): 9 INJECTION, SOLUTION INTRAVENOUS at 15:11

## 2019-11-26 NOTE — ASU PATIENT PROFILE, ADULT - LANGUAGE ASSISTANCE NEEDED
patient permitted daughter to translate. confirmed with  . ID #[id number]/Yes-Patient/Caregiver accepts free interpretation services...

## 2019-11-29 LAB — SURGICAL PATHOLOGY STUDY: SIGNIFICANT CHANGE UP

## 2019-12-16 ENCOUNTER — APPOINTMENT (OUTPATIENT)
Dept: INTERNAL MEDICINE | Facility: CLINIC | Age: 59
End: 2019-12-16

## 2020-01-06 ENCOUNTER — APPOINTMENT (OUTPATIENT)
Dept: INTERNAL MEDICINE | Facility: CLINIC | Age: 60
End: 2020-01-06

## 2020-01-23 ENCOUNTER — OTHER (OUTPATIENT)
Age: 60
End: 2020-01-23

## 2020-01-23 ENCOUNTER — APPOINTMENT (OUTPATIENT)
Dept: GASTROENTEROLOGY | Facility: CLINIC | Age: 60
End: 2020-01-23
Payer: MEDICAID

## 2020-01-23 ENCOUNTER — OUTPATIENT (OUTPATIENT)
Dept: OUTPATIENT SERVICES | Facility: HOSPITAL | Age: 60
LOS: 1 days | End: 2020-01-23

## 2020-01-23 VITALS
SYSTOLIC BLOOD PRESSURE: 90 MMHG | BODY MASS INDEX: 28.13 KG/M2 | DIASTOLIC BLOOD PRESSURE: 70 MMHG | HEART RATE: 80 BPM | WEIGHT: 149 LBS | HEIGHT: 61 IN | OXYGEN SATURATION: 99 %

## 2020-01-23 DIAGNOSIS — K27.9 PEPTIC ULCER, SITE UNSPECIFIED, UNSPECIFIED AS ACUTE OR CHRONIC, WITHOUT HEMORRHAGE OR PERFORATION: ICD-10-CM

## 2020-01-23 DIAGNOSIS — Z98.890 OTHER SPECIFIED POSTPROCEDURAL STATES: Chronic | ICD-10-CM

## 2020-01-23 DIAGNOSIS — K31.89 OTHER DISEASES OF STOMACH AND DUODENUM: ICD-10-CM

## 2020-01-23 PROCEDURE — 99214 OFFICE O/P EST MOD 30 MIN: CPT | Mod: GC

## 2020-01-23 NOTE — HISTORY OF PRESENT ILLNESS
[Heartburn] : denies heartburn [Nausea] : denies nausea [Vomiting] : denies vomiting [Diarrhea] : denies diarrhea [Constipation] : denies constipation [Abdominal Pain] : denies abdominal pain [Yellow Skin Or Eyes (Jaundice)] : denies jaundice [Abdominal Swelling] : denies abdominal swelling [Rectal Pain] : denies rectal pain [de-identified] : 58 F w/ heavy ETOH use, daily smoker, HTN, and anxiety who presents to our clinic for follow up after her recent admission to the hospital for GI bleed in 08/2019. At that time had a Hb of 5.9 on admission. s/p 5u PRBC, s/p bedside EGD 08/02 showing proximal gastric body ulcer with adherent clot that could not be removed. Epi injected and clot cauterized with bicap. Has been taking PPI bid until last week she was switched to PPI once a day\par \par Visit: 1/23/2020:\par Currently denies any complaints. Normal brown BMs. Denies melena, hematochezia. Continues smoking cigarettes. Continues to take PPI BID.\par \par \par 11/26/2019\par EGD:\par Impression: \par - Normal esophagus.\par - Non-bleeding gastric ulcer. Biopsied. The ulcer started to ooze after insufflation and thus 4 clips (MR conditional) were placed.\par - Erythematous, inflamed, nodular and texture changed mucosa in the stomach. Biopsied.\par - Normal duodenal bulb and 2nd part of the duodenum.\par \par Colonoscopy\par Impression: \par - The examined portion of the ileum was normal.\par - One 6 mm polyp in the transverse colon, removed using injection-lift and a hot snare. Resected and retrieved. Clip (MR conditional) was placed.\par - Diverticulosis in the left colon.\par - Non-bleeding internal hemorrhoids.\par \par Pathology\par Final Diagnosis\par 1. Colon, transverse, polyp, biopsy:\par - Tubular adenoma.\par \par 2. Gastric, biopsy:\par - Gastric antral and oxyntic mucosa with intestinal metaplasia and chronic gastritis.\par - Negative for Helicobacter pylori (special stain).\par - Negative for dysplasia.\par \par 3. Gastric ulcer, biopsy:\par - Gastric mucosa with extensive intestinal metaplasia and chronic gastritis.\par - Negative for Helicobacter pylori (special stain).\par - Negative for dysplasia.\par \par Recommendation:\par - Use a proton pump inhibitor PO BID.\par - Repeat the upper endoscopy in 8 weeks for surveillance.\par - Repeat colonoscopy in 5 years for surveillance.\par - Recommend high fibre diet. \par \par CT abdomen showed normal liver in 08/2019

## 2020-01-23 NOTE — PHYSICAL EXAM
[General Appearance - Alert] : alert [General Appearance - In No Acute Distress] : in no acute distress [PERRL With Normal Accommodation] : pupils were equal in size, round, and reactive to light [Jugular Venous Distention Increased] : there was no jugular-venous distention [Auscultation Breath Sounds / Voice Sounds] : lungs were clear to auscultation bilaterally [Heart Sounds] : normal S1 and S2 [Bowel Sounds] : normal bowel sounds [Abdomen Soft] : soft [Abdomen Tenderness] : non-tender [Abdomen Mass (___ Cm)] : no abdominal mass palpated [Oriented To Time, Place, And Person] : oriented to person, place, and time [No Focal Deficits] : no focal deficits [Abnormal Walk] : normal gait [Involuntary Movements] : no involuntary movements were seen [Skin Color & Pigmentation] : normal skin color and pigmentation [] : no rash

## 2020-01-23 NOTE — END OF VISIT
[] : Fellow [FreeTextEntry3] : \par 60 yo pmh etoh use, htn, h/o GIB 2/2 PUD s/p epi/bicap, repeat EGD 11/2019 with Gastric IM and persistent PUD.  On BID PPI.  Needs repeat EGD with Gastric Mapping.  Advised tobacco cessation that may help with ulcer healing.  Needs repeat colon 11/2024.  HCV ab negative 8/2019.

## 2020-01-23 NOTE — ASSESSMENT
[FreeTextEntry1] : 58 F with heavy ETOH use, daily smoker, HTN, and anxiety who presents to our clinic for follow up after her recent admission to the hospital for GI bleed in 08/2019.  s/p EGD showed gastric ulcer. She finished 8 weeks therapy of PPI bid. No h/o NSAIDs use. Repeat EGD on 11/25 with 15mm non bleeding gastric ulcer, biopsied with oozing afterwards and 4 clips placed.\par \par 1. H/o GI bleed / Gastric ulcer: non healing on surveillance EGD 11/26/2019\par 2. Gastric intestinal metaplasia on bx with chronic gastritis\par 2. Tubular adenoma: 6mm transverse colon polyp in 11/26/2019, recommend f/u colonoscopy in 5 years\par \par Recommendations:\par - Repeat EGD to confirm ulcer healing\par - will need gastric mapping biopsies\par - c/w PPI pantorprazole 40mg BID\par - Colonoscopy for colon cancer surveillance in 11/2024\par - recommend alcohol and tobacco cessation

## 2020-01-24 ENCOUNTER — OUTPATIENT (OUTPATIENT)
Dept: OUTPATIENT SERVICES | Facility: HOSPITAL | Age: 60
LOS: 1 days | Discharge: HOME | End: 2020-01-24
Payer: MEDICAID

## 2020-01-24 ENCOUNTER — RESULT REVIEW (OUTPATIENT)
Age: 60
End: 2020-01-24

## 2020-01-24 VITALS
RESPIRATION RATE: 16 BRPM | DIASTOLIC BLOOD PRESSURE: 63 MMHG | HEART RATE: 69 BPM | SYSTOLIC BLOOD PRESSURE: 103 MMHG | OXYGEN SATURATION: 99 %

## 2020-01-24 VITALS
HEART RATE: 69 BPM | WEIGHT: 147.93 LBS | OXYGEN SATURATION: 100 % | TEMPERATURE: 99 F | DIASTOLIC BLOOD PRESSURE: 83 MMHG | HEIGHT: 61 IN | RESPIRATION RATE: 12 BRPM | SYSTOLIC BLOOD PRESSURE: 131 MMHG

## 2020-01-24 DIAGNOSIS — Z98.890 OTHER SPECIFIED POSTPROCEDURAL STATES: Chronic | ICD-10-CM

## 2020-01-24 DIAGNOSIS — K25.9 GASTRIC ULCER, UNSPECIFIED AS ACUTE OR CHRONIC, WITHOUT HEMORRHAGE OR PERFORATION: ICD-10-CM

## 2020-01-24 PROCEDURE — 43239 EGD BIOPSY SINGLE/MULTIPLE: CPT | Mod: GC

## 2020-01-24 PROCEDURE — 88305 TISSUE EXAM BY PATHOLOGIST: CPT | Mod: 26

## 2020-01-24 PROCEDURE — 88312 SPECIAL STAINS GROUP 1: CPT | Mod: 26

## 2020-01-24 RX ORDER — SODIUM CHLORIDE 9 MG/ML
1000 INJECTION, SOLUTION INTRAVENOUS
Refills: 0 | Status: DISCONTINUED | OUTPATIENT
Start: 2020-01-24 | End: 2020-02-11

## 2020-01-24 RX ADMIN — SODIUM CHLORIDE 30 MILLILITER(S): 9 INJECTION, SOLUTION INTRAVENOUS at 09:11

## 2020-01-27 ENCOUNTER — APPOINTMENT (OUTPATIENT)
Dept: INTERNAL MEDICINE | Facility: CLINIC | Age: 60
End: 2020-01-27

## 2020-01-28 LAB — SURGICAL PATHOLOGY STUDY: SIGNIFICANT CHANGE UP

## 2020-02-03 ENCOUNTER — LABORATORY RESULT (OUTPATIENT)
Age: 60
End: 2020-02-03

## 2020-02-03 ENCOUNTER — APPOINTMENT (OUTPATIENT)
Dept: INTERNAL MEDICINE | Facility: CLINIC | Age: 60
End: 2020-02-03
Payer: MEDICAID

## 2020-02-03 ENCOUNTER — OUTPATIENT (OUTPATIENT)
Dept: OUTPATIENT SERVICES | Facility: HOSPITAL | Age: 60
LOS: 1 days | End: 2020-02-03

## 2020-02-03 ENCOUNTER — MED ADMIN CHARGE (OUTPATIENT)
Age: 60
End: 2020-02-03

## 2020-02-03 VITALS
BODY MASS INDEX: 27.94 KG/M2 | DIASTOLIC BLOOD PRESSURE: 70 MMHG | WEIGHT: 148 LBS | HEART RATE: 89 BPM | HEIGHT: 61 IN | SYSTOLIC BLOOD PRESSURE: 135 MMHG

## 2020-02-03 DIAGNOSIS — F10.10 ALCOHOL ABUSE, UNCOMPLICATED: ICD-10-CM

## 2020-02-03 DIAGNOSIS — D62 ACUTE POSTHEMORRHAGIC ANEMIA: ICD-10-CM

## 2020-02-03 DIAGNOSIS — F17.200 NICOTINE DEPENDENCE, UNSPECIFIED, UNCOMPLICATED: ICD-10-CM

## 2020-02-03 DIAGNOSIS — I10 ESSENTIAL (PRIMARY) HYPERTENSION: ICD-10-CM

## 2020-02-03 DIAGNOSIS — Z87.891 PERSONAL HISTORY OF NICOTINE DEPENDENCE: ICD-10-CM

## 2020-02-03 DIAGNOSIS — D12.6 BENIGN NEOPLASM OF COLON, UNSPECIFIED: ICD-10-CM

## 2020-02-03 DIAGNOSIS — Z98.890 OTHER SPECIFIED POSTPROCEDURAL STATES: Chronic | ICD-10-CM

## 2020-02-03 DIAGNOSIS — K92.2 GASTROINTESTINAL HEMORRHAGE, UNSPECIFIED: ICD-10-CM

## 2020-02-03 DIAGNOSIS — Z12.11 ENCOUNTER FOR SCREENING FOR MALIGNANT NEOPLASM OF COLON: ICD-10-CM

## 2020-02-03 DIAGNOSIS — K25.9 GASTRIC ULCER, UNSPECIFIED AS ACUTE OR CHRONIC, WITHOUT HEMORRHAGE OR PERFORATION: ICD-10-CM

## 2020-02-03 LAB
BASOPHILS # BLD AUTO: 0.03 K/UL — SIGNIFICANT CHANGE UP (ref 0–0.2)
BASOPHILS NFR BLD AUTO: 0.4 % — SIGNIFICANT CHANGE UP (ref 0–2)
EOSINOPHIL # BLD AUTO: 0.05 K/UL — SIGNIFICANT CHANGE UP (ref 0–0.5)
EOSINOPHIL NFR BLD AUTO: 0.6 % — SIGNIFICANT CHANGE UP (ref 0–6)
HCT VFR BLD CALC: 36.1 % — SIGNIFICANT CHANGE UP (ref 34.5–45)
HGB BLD-MCNC: 11.1 G/DL — LOW (ref 11.5–15.5)
HIV 1+2 AB+HIV1 P24 AG SERPL QL IA: SIGNIFICANT CHANGE UP
IMM GRANULOCYTES NFR BLD AUTO: 0.4 % — SIGNIFICANT CHANGE UP (ref 0–1.5)
LYMPHOCYTES # BLD AUTO: 1.82 K/UL — SIGNIFICANT CHANGE UP (ref 1–3.3)
LYMPHOCYTES # BLD AUTO: 22.2 % — SIGNIFICANT CHANGE UP (ref 13–44)
MCHC RBC-ENTMCNC: 26.2 PG — LOW (ref 27–34)
MCHC RBC-ENTMCNC: 30.7 % — LOW (ref 32–36)
MCV RBC AUTO: 85.3 FL — SIGNIFICANT CHANGE UP (ref 80–100)
MONOCYTES # BLD AUTO: 0.52 K/UL — SIGNIFICANT CHANGE UP (ref 0–0.9)
MONOCYTES NFR BLD AUTO: 6.3 % — SIGNIFICANT CHANGE UP (ref 2–14)
NEUTROPHILS # BLD AUTO: 5.75 K/UL — SIGNIFICANT CHANGE UP (ref 1.8–7.4)
NEUTROPHILS NFR BLD AUTO: 70.1 % — SIGNIFICANT CHANGE UP (ref 43–77)
NRBC # FLD: 0 K/UL — SIGNIFICANT CHANGE UP (ref 0–0)
PLATELET # BLD AUTO: 357 K/UL — SIGNIFICANT CHANGE UP (ref 150–400)
PMV BLD: 9.4 FL — SIGNIFICANT CHANGE UP (ref 7–13)
RBC # BLD: 4.23 M/UL — SIGNIFICANT CHANGE UP (ref 3.8–5.2)
RBC # FLD: 15.8 % — HIGH (ref 10.3–14.5)
RETICS #: 62 K/UL — SIGNIFICANT CHANGE UP (ref 25–125)
RETICS/RBC NFR: 1.5 % — SIGNIFICANT CHANGE UP (ref 0.5–2.5)
WBC # BLD: 8.2 K/UL — SIGNIFICANT CHANGE UP (ref 3.8–10.5)
WBC # FLD AUTO: 8.2 K/UL — SIGNIFICANT CHANGE UP (ref 3.8–10.5)

## 2020-02-03 PROCEDURE — 99214 OFFICE O/P EST MOD 30 MIN: CPT

## 2020-02-03 RX ORDER — ZOSTER VACCINE RECOMBINANT, ADJUVANTED 50 MCG/0.5
50 KIT INTRAMUSCULAR ONCE
Qty: 1 | Refills: 0 | Status: DISCONTINUED | COMMUNITY
Start: 2019-09-16 | End: 2020-02-03

## 2020-02-03 RX ORDER — NICOTINE POLACRILEX 2 MG/1
2 LOZENGE ORAL
Qty: 60 | Refills: 3 | Status: DISCONTINUED | COMMUNITY
Start: 2019-09-16 | End: 2020-02-03

## 2020-02-03 RX ORDER — PHENYLEPHRINE HCL 10 MG
7 TABLET ORAL DAILY
Qty: 30 | Refills: 3 | Status: DISCONTINUED | COMMUNITY
Start: 2019-10-07 | End: 2020-02-03

## 2020-02-03 RX ORDER — NICOTINE 21 MG/24HR
21 PATCH, TRANSDERMAL 24 HOURS TRANSDERMAL DAILY
Qty: 30 | Refills: 5 | Status: ACTIVE | COMMUNITY
Start: 2019-08-12 | End: 1900-01-01

## 2020-02-03 RX ORDER — POLYETHYLENE GLYCOL-3350 AND ELECTROLYTES WITH FLAVOR PACK 240; 5.84; 2.98; 6.72; 22.72 G/278.26G; G/278.26G; G/278.26G; G/278.26G; G/278.26G
240 POWDER, FOR SOLUTION ORAL
Qty: 1 | Refills: 0 | Status: DISCONTINUED | COMMUNITY
Start: 2019-11-25 | End: 2020-02-03

## 2020-02-03 RX ORDER — FOLIC ACID 1 MG/1
1 TABLET ORAL DAILY
Qty: 30 | Refills: 3 | Status: DISCONTINUED | COMMUNITY
Start: 2019-08-12 | End: 2020-02-03

## 2020-02-03 RX ORDER — ZOSTER VACCINE RECOMBINANT, ADJUVANTED 50 MCG/0.5
50 KIT INTRAMUSCULAR
Qty: 1 | Refills: 0 | Status: DISCONTINUED | COMMUNITY
Start: 2019-10-07 | End: 2020-02-03

## 2020-02-03 NOTE — HISTORY OF PRESENT ILLNESS
[Family Member] : family member [FreeTextEntry1] : follow up on HTN [de-identified] : JUAN C FARIRS is a 59 year old female with recent upper GI bleed, HTN, smoking, anemia due to bleed here for f/u on BP and smoking. Pt feels well and has no new concerns. \par \par # gastric ulcer: s/p EGD. path neg for H Pylori. on PPI BID per GI. on EGD on 1/24- distal esophagus chronic carditis and pancreatic cell metaplasia - will check with GI\par #tubular adenoma: found on recent colonoscopy. rec recheck in 5 years. \par #Smoking: restated smoking, 5 cig per day . \par #HTN: Pt reports compliance with medications without any new side effects. compliant with low salt diet\par #anemia: per last conversation, pt wanted to increase iron intake via diet. will recheck today. \par #Etoh use: stopped drinking, last 6 months no drink. \par

## 2020-02-04 LAB
HCV AB S/CO SERPL IA: 0.49 S/CO — SIGNIFICANT CHANGE UP (ref 0–0.99)
HCV AB SERPL-IMP: SIGNIFICANT CHANGE UP

## 2020-02-27 ENCOUNTER — OUTPATIENT (OUTPATIENT)
Dept: OUTPATIENT SERVICES | Facility: HOSPITAL | Age: 60
LOS: 1 days | End: 2020-02-27

## 2020-02-27 ENCOUNTER — APPOINTMENT (OUTPATIENT)
Dept: GASTROENTEROLOGY | Facility: CLINIC | Age: 60
End: 2020-02-27
Payer: MEDICAID

## 2020-02-27 VITALS
HEART RATE: 59 BPM | WEIGHT: 148 LBS | SYSTOLIC BLOOD PRESSURE: 130 MMHG | DIASTOLIC BLOOD PRESSURE: 80 MMHG | OXYGEN SATURATION: 99 % | HEIGHT: 61 IN | BODY MASS INDEX: 27.94 KG/M2

## 2020-02-27 DIAGNOSIS — K31.A0 GASTRIC INTESTINAL METAPLASIA, UNSPECIFIED: ICD-10-CM

## 2020-02-27 DIAGNOSIS — K25.9 GASTRIC ULCER, UNSPECIFIED AS ACUTE OR CHRONIC, WITHOUT HEMORRHAGE OR PERFORATION: ICD-10-CM

## 2020-02-27 DIAGNOSIS — Z98.890 OTHER SPECIFIED POSTPROCEDURAL STATES: Chronic | ICD-10-CM

## 2020-02-27 DIAGNOSIS — K31.89 OTHER DISEASES OF STOMACH AND DUODENUM: ICD-10-CM

## 2020-02-27 DIAGNOSIS — D12.6 BENIGN NEOPLASM OF COLON, UNSPECIFIED: ICD-10-CM

## 2020-02-27 DIAGNOSIS — K25.9 GASTRIC ULCER, UNSPECIFIED AS ACUTE OR CHRONIC, W/OUT HEMORRHAGE OR PERFORATION: ICD-10-CM

## 2020-02-27 PROCEDURE — 99214 OFFICE O/P EST MOD 30 MIN: CPT | Mod: GC

## 2020-02-27 NOTE — ASSESSMENT
[FreeTextEntry1] : 58 F with heavy ETOH use, daily smoker, HTN, and anxiety who presents to our clinic for follow up after her recent admission to the hospital for GI bleed in 08/2019.  s/p EGD showed gastric ulcer. She finished 8 weeks therapy of PPI bid. No h/o NSAIDs use. Repeat EGD on 11/25 with 15mm non bleeding gastric ulcer, biopsied with oozing afterwards and 4 clips placed.\par \par Impression:\par # Gastric Ulcer: Healing on repeat EGD. Biopsies negative for HP and dysplasia\par # Colon Cancer Screening: Hx of Tubular Adenoma 11/2019\par # Gastric Intestinal Metaplasia\par # Esophageal pancreatic cell metaplasia\par # Overweight\par # Hep C Screening: Negative 2/2020\par # EtOH abuse\par \par Impression:\par - PPI qd given idiopathic PUD\par - Repeat colonoscopy in 11/2024\par - Repeat EGD within 1 year for gastric intestinal metaplasia mapping given positive metaplasia on gastric biopsies (near ulcer site)\par - Weight loss counseling provided\par \par D/Q Dr. Nielson

## 2020-02-27 NOTE — END OF VISIT
[] : Fellow [FreeTextEntry3] : Patient here for follow up of gastric ulcer. Recent EGD with evidence of healing. No obvious etiology for ulcer. Given biopsy c/w IM, would consider repeat EGD within year for gastric mapping. Will maintain on PPI for now. Should have annual U/A, BMP, Mg. \par Not yet due for surveillance endoscopy.

## 2020-02-27 NOTE — PHYSICAL EXAM
[General Appearance - Alert] : alert [General Appearance - In No Acute Distress] : in no acute distress [PERRL With Normal Accommodation] : pupils were equal in size, round, and reactive to light [Jugular Venous Distention Increased] : there was no jugular-venous distention [Auscultation Breath Sounds / Voice Sounds] : lungs were clear to auscultation bilaterally [Heart Rate And Rhythm] : heart rate was normal and rhythm regular [Heart Sounds] : normal S1 and S2 [Edema] : there was no peripheral edema [Abdomen Soft] : soft [Bowel Sounds] : normal bowel sounds [Abdomen Tenderness] : non-tender [Abdomen Mass (___ Cm)] : no abdominal mass palpated [Involuntary Movements] : no involuntary movements were seen [Abnormal Walk] : normal gait [Skin Color & Pigmentation] : normal skin color and pigmentation [] : no rash [No Focal Deficits] : no focal deficits [Oriented To Time, Place, And Person] : oriented to person, place, and time

## 2020-02-27 NOTE — HISTORY OF PRESENT ILLNESS
[___ Month(s) Ago] : [unfilled] month(s) ago [de-identified] : Underwent EGD [de-identified] : 58 F hx of HTN and anxiety presents for follow up after EGD. She was admitted to the hospital in 8/2019 in the setting of GI bleeding. Required 5U pRBCs and underwent an EGD showed a proximal gastric body with an adherent clot that couldn't be removed. Epinephrine was injected and clot cauterized with bicap. Underwent a repeat EGD which showed previously placed clips at the site of the previous ulcer. \par \par EGD: 1/2020\par An area of suspected ectopic gastric mucosa was seen in the esophagus. Biopsies negative\par Z-line irregular; biopsies showing 'pancreatic cell metaplasia'\par Nonbleeding gastric erosions: HP biopsies negative\par Clips seen from previous ulcer site. Biopsies negative for dysplasia

## 2020-03-10 ENCOUNTER — RX RENEWAL (OUTPATIENT)
Age: 60
End: 2020-03-10

## 2020-06-02 ENCOUNTER — RX RENEWAL (OUTPATIENT)
Age: 60
End: 2020-06-02

## 2020-08-03 ENCOUNTER — APPOINTMENT (OUTPATIENT)
Dept: INTERNAL MEDICINE | Facility: CLINIC | Age: 60
End: 2020-08-03

## 2020-08-14 RX ORDER — LISINOPRIL 30 MG/1
30 TABLET ORAL DAILY
Qty: 90 | Refills: 1 | Status: ACTIVE | COMMUNITY
Start: 2019-08-12 | End: 1900-01-01

## 2020-08-14 RX ORDER — PANTOPRAZOLE 40 MG/1
40 TABLET, DELAYED RELEASE ORAL TWICE DAILY
Qty: 180 | Refills: 1 | Status: ACTIVE | COMMUNITY
Start: 2019-08-12 | End: 1900-01-01

## 2020-09-10 ENCOUNTER — APPOINTMENT (OUTPATIENT)
Dept: OBGYN | Facility: HOSPITAL | Age: 60
End: 2020-09-10
Payer: MEDICAID

## 2020-09-10 ENCOUNTER — RESULT REVIEW (OUTPATIENT)
Age: 60
End: 2020-09-10

## 2020-09-10 ENCOUNTER — OUTPATIENT (OUTPATIENT)
Dept: OUTPATIENT SERVICES | Facility: HOSPITAL | Age: 60
LOS: 1 days | End: 2020-09-10

## 2020-09-10 VITALS
HEIGHT: 61 IN | DIASTOLIC BLOOD PRESSURE: 66 MMHG | HEART RATE: 60 BPM | TEMPERATURE: 97.9 F | SYSTOLIC BLOOD PRESSURE: 127 MMHG | BODY MASS INDEX: 29.64 KG/M2 | WEIGHT: 157 LBS

## 2020-09-10 DIAGNOSIS — Z98.890 OTHER SPECIFIED POSTPROCEDURAL STATES: Chronic | ICD-10-CM

## 2020-09-10 DIAGNOSIS — Z01.419 ENCOUNTER FOR GYNECOLOGICAL EXAMINATION (GENERAL) (ROUTINE) WITHOUT ABNORMAL FINDINGS: ICD-10-CM

## 2020-09-10 PROCEDURE — 99386 PREV VISIT NEW AGE 40-64: CPT

## 2020-09-10 NOTE — COUNSELING
[Breast Self Exam] : breast self exam [Exercise] : exercise [Nutrition] : nutrition [STD (testing, results, tx)] : STD (testing, results, tx)

## 2020-09-11 LAB — HPV HIGH+LOW RISK DNA PNL CVX: SIGNIFICANT CHANGE UP

## 2020-09-14 LAB — CYTOLOGY SPEC DOC CYTO: SIGNIFICANT CHANGE UP

## 2020-09-25 DIAGNOSIS — Z00.00 ENCOUNTER FOR GENERAL ADULT MEDICAL EXAMINATION WITHOUT ABNORMAL FINDINGS: ICD-10-CM

## 2020-10-03 NOTE — HISTORY OF PRESENT ILLNESS
[Postmenopausal] : is postmenopausal [Currently In Menopause] : currently in menopause [Definite:  ___ (Date)] : the last menstrual period was [unfilled] [de-identified] : declines [Fever] : no fever [Nausea] : no nausea [Vomiting] : no vomiting [Diarrhea] : no diarrhea [Vaginal Bleeding] : no vaginal bleeding [Pelvic Pressure] : no pelvic pressure [Dysuria] : no dysuria [Sexually Active] : is not sexually active

## 2020-10-03 NOTE — PHYSICAL EXAM
[Alert] : alert [Awake] : awake [Soft] : soft [Oriented x3] : oriented to person, place, and time [Labia Majora] : labia major [Labia Minora] : labia minora [Normal] : clitoris [Atrophy] : atrophy [No Bleeding] : there was no active vaginal bleeding [Dry Mucosa] : dry mucosa [Normal Position] : in a normal position [Enlarged ___ wks] : enlarged [unfilled] ~Uweeks [Uterine Adnexae] : were not tender and not enlarged [Acute Distress] : no acute distress [LAD] : no lymphadenopathy [Thyroid Nodule] : no thyroid nodule [Goiter] : no goiter [Mass] : no breast mass [Nipple Discharge] : no nipple discharge [Axillary LAD] : no axillary lymphadenopathy [Tender] : non tender [Distended] : not distended [Depressed Mood] : not depressed [Flat Affect] : affect not flat [Discharge] : had no discharge [Motion Tenderness] : there was no cervical motion tenderness [Tenderness] : nontender [Adnexa Tenderness] : were not tender [Ovarian Mass (___ Cm)] : there were no adnexal masses

## 2021-03-29 ENCOUNTER — RX RENEWAL (OUTPATIENT)
Age: 61
End: 2021-03-29

## 2021-03-29 RX ORDER — ATENOLOL 50 MG/1
50 TABLET ORAL
Qty: 90 | Refills: 2 | Status: ACTIVE | COMMUNITY
Start: 2019-08-12 | End: 1900-01-01

## 2021-09-13 ENCOUNTER — APPOINTMENT (OUTPATIENT)
Dept: OBGYN | Facility: HOSPITAL | Age: 61
End: 2021-09-13

## 2021-10-06 PROBLEM — K31.A0 INTESTINAL METAPLASIA OF GASTRIC MUCOSA: Status: ACTIVE | Noted: 2020-02-27

## 2022-11-22 NOTE — PATIENT PROFILE ADULT - MEDICATIONS/VISITS
1900: [facility] Pt in bed, unresponsive to voice or touch, appears comfortable. Elevated HR at 112 but breathing regular and soft with clear lungs. Sister and cousin are at the bedside, stating they are doing fine and don't need anything at the moment. Medicated with scheduled Ativan. Dilaudid pump running continuously at 8 mg per hour. 5990: Pt minimally responsive to voice. Mouth care done and face washed, as well as lotion applied at this time. Pt grabs in the air, very drowsy, grimaces when being cleaned up. Asked if she was in pain and she said yes. Also said yes to pain medicine. Medicated with PRN Haldol and Dilaudid at this time.       NAME OF PATIENT:  Margarita Randall    LEVEL OF CARE:  Routine    REASON FOR GIP:   n/a    *PATIENT REMAINS ELIGIBLE FOR GIP LEVEL OF CARE AS EVIDENCED BY: (MUST BE ADDRESSED OF PATIENT GIP)      REASON FOR RESPITE:  n/a    O2 SAFETY:  Concentrator positioning (6\" from furniture/drapes), No petroleum based products on face while oxygen in use and Oxygen sign on the door    FALL INTERVENTIONS PROVIDED:   Implemented/recommended environmental changes (remove hazards, lower bed, improve lighting, etc.)    INTERDISPLINARY COMMUNICATION/COLLABORATION:  Physician, MSW, Leta and RN, CNA    NEW MEDICATION INITIATION DOCUMENTATION:  no new meds initiated on this shift    Reason medication is being initiated:      MD / Provider name consulted re: change in status / initiation of new medication:     New Symptom(s):      New Order(s):      Name of the person notified of the changes:      Name of person being taught:      Instructions given:      Side Effects taught:      Response to teaching:        COMFORTABLE DYING MEASURE:  Is Patient/family satisfied with symptom level?  yes    DISCHARGE PLAN:  Unsure of placement, pt may  at the [facility]
PAST MEDICAL HISTORY:  DM2 (diabetes mellitus, type 2)     HTN (hypertension)     
no

## 2023-01-01 NOTE — OB HISTORY
Statement Selected [Pregnancy History] : boy [___] : no pregnancy complications reported [FreeTextEntry1] : repeat c/s

## 2024-05-30 NOTE — ASU PREOP CHECKLIST - AICD PRESENT
----- Message from Faraz Sommers MD sent at 5/29/2024  9:55 AM CDT -----  Your hemoglobin is low however stable.  We will continue to monitor.  May consider taking over the counter Iron supplements  Your platelets are normal.  Your electrolytes are stable.  Your glucose is abnormal.  Continue current treatment.  You need to maintain your carbohydrate intake.  Diet and Exercise.   Your kidney function is Abnormal.  We will continue to monitor.  Please increase your fluid intake. and Your kidney function is Abnormal. We would like you to get an US of the kidney.  Order has been placed.  Please call [phone] to make an appointment.    Your liver  function is abnormal.  We will continue to monitor.  Your cholesterol panel is stable.  Continue to take the medication.  Do not lose your focus on diet and exercise.    Your TSH is abnormal and free T4 is normal.  We will continue to monitor.  Your vitamin D level is normal.  Your vitamin B12 is normal.  Your folic acid is normal  Your urine test is stable.      Schedule follow up: in 6 months   no